# Patient Record
Sex: MALE | Race: BLACK OR AFRICAN AMERICAN | NOT HISPANIC OR LATINO | Employment: UNEMPLOYED | URBAN - METROPOLITAN AREA
[De-identification: names, ages, dates, MRNs, and addresses within clinical notes are randomized per-mention and may not be internally consistent; named-entity substitution may affect disease eponyms.]

---

## 2017-04-18 ENCOUNTER — HOSPITAL ENCOUNTER (EMERGENCY)
Facility: CLINIC | Age: 22
Discharge: HOME | End: 2017-04-18
Attending: EMERGENCY MEDICINE | Admitting: EMERGENCY MEDICINE
Payer: COMMERCIAL

## 2017-04-18 VITALS
HEIGHT: 67 IN | DIASTOLIC BLOOD PRESSURE: 63 MMHG | BODY MASS INDEX: 21.19 KG/M2 | WEIGHT: 135 LBS | HEART RATE: 70 BPM | SYSTOLIC BLOOD PRESSURE: 119 MMHG | RESPIRATION RATE: 16 BRPM | OXYGEN SATURATION: 98 % | TEMPERATURE: 97.6 F

## 2017-04-18 DIAGNOSIS — M54.50 ACUTE LEFT-SIDED LOW BACK PAIN WITHOUT SCIATICA: ICD-10-CM

## 2017-04-18 LAB
ALBUMIN UR-MCNC: NEGATIVE MG/DL
APPEARANCE UR: CLEAR
BILIRUB UR QL STRIP: NEGATIVE
COLOR UR AUTO: YELLOW
GLUCOSE UR STRIP-MCNC: NEGATIVE MG/DL
HGB UR QL STRIP: NEGATIVE
KETONES UR STRIP-MCNC: NEGATIVE MG/DL
LEUKOCYTE ESTERASE UR QL STRIP: NEGATIVE
MUCOUS THREADS #/AREA URNS LPF: PRESENT /LPF
NITRATE UR QL: NEGATIVE
PH UR STRIP: 5 PH (ref 5–7)
RBC #/AREA URNS AUTO: 1 /HPF (ref 0–2)
SP GR UR STRIP: 1.02 (ref 1–1.03)
SQUAMOUS #/AREA URNS AUTO: <1 /HPF (ref 0–1)
URN SPEC COLLECT METH UR: ABNORMAL
UROBILINOGEN UR STRIP-MCNC: NORMAL MG/DL (ref 0–2)
WBC #/AREA URNS AUTO: 4 /HPF (ref 0–2)

## 2017-04-18 PROCEDURE — 81001 URINALYSIS AUTO W/SCOPE: CPT | Performed by: EMERGENCY MEDICINE

## 2017-04-18 PROCEDURE — 25000132 ZZH RX MED GY IP 250 OP 250 PS 637: Performed by: EMERGENCY MEDICINE

## 2017-04-18 PROCEDURE — 87086 URINE CULTURE/COLONY COUNT: CPT | Performed by: EMERGENCY MEDICINE

## 2017-04-18 PROCEDURE — 99283 EMERGENCY DEPT VISIT LOW MDM: CPT

## 2017-04-18 RX ORDER — HYDROCODONE BITARTRATE AND ACETAMINOPHEN 5; 325 MG/1; MG/1
2 TABLET ORAL ONCE
Status: COMPLETED | OUTPATIENT
Start: 2017-04-18 | End: 2017-04-18

## 2017-04-18 RX ORDER — HYDROCODONE BITARTRATE AND ACETAMINOPHEN 5; 325 MG/1; MG/1
1-2 TABLET ORAL EVERY 4 HOURS PRN
Qty: 15 TABLET | Refills: 0 | Status: SHIPPED | OUTPATIENT
Start: 2017-04-18 | End: 2017-07-08

## 2017-04-18 RX ADMIN — HYDROCODONE BITARTRATE AND ACETAMINOPHEN 2 TABLET: 5; 325 TABLET ORAL at 09:22

## 2017-04-18 ASSESSMENT — ENCOUNTER SYMPTOMS
VOMITING: 0
DYSURIA: 0
HEMATURIA: 0
NAUSEA: 0
FLANK PAIN: 1
DIARRHEA: 0
FREQUENCY: 1

## 2017-04-18 NOTE — ED AVS SNAPSHOT
Emergency Department    [address] [place] [address]    Phone:  [phone]    Fax:  [phone]                                       Danny Rico   MRN: [id number]    Department:   Emergency Department   Date of Visit:  4/18/2017           After Visit Summary Signature Page     I have received my discharge instructions, and my questions have been answered. I have discussed any challenges I see with this plan with the nurse or doctor.    ..........................................................................................................................................  Patient/Patient Representative Signature      ..........................................................................................................................................  Patient Representative Print Name and Relationship to Patient    ..................................................               ................................................  Date                                            Time    ..........................................................................................................................................  Reviewed by Signature/Title    ...................................................              ..............................................  Date                                                            Time

## 2017-04-18 NOTE — ED AVS SNAPSHOT
Emergency Department    [address] [facility] [address]    Phone:  [phone]    Fax:  [phone]                                       Danny Rico   MRN: [id number]    Department:   Emergency Department   Date of Visit:  4/18/2017           Patient Information     Date Of Birth          1995        Your diagnoses for this visit were:     Acute left-sided low back pain without sciatica        You were seen by Carmine Garcia MD.      Follow-up Information     Schedule an appointment as soon as possible for a visit with Carlos Ybarra MD.    Specialty:  Family Practice    Contact information:    [address] [address]  [phone]          Follow up with  Emergency Department.    Specialty:  EMERGENCY MEDICINE    Why:  If symptoms worsen    Contact information:    [address] [facility] [id number] [phone]        Discharge Instructions         Discharge Instructions  Back Pain  You were seen today for back pain. Back pain can have many causes, but most will get better without surgery or other specific treatment. Sometimes there is a herniated ( slipped ) disc. We don t usually do MRI scans to look for these right away, since most herniated discs will get better on their own with time.  Today, we did not find any evidence that your back pain was caused by a serious condition, such as an infection, fracture, or tumor. However, sometimes symptoms develop over time and cannot be found during an emergency visit, so it is very important that you follow up with your primary doctor.  Return to the Emergency Department if:    You develop a fever with your back pain.     You have weakness or change in sensation in one or both legs.    You lose control of your bowels or bladder, or can t empty your bladder.    Your pain gets much worse.     Follow-up with your doctor:    Unless your pain has completely gone  away, please make an appointment with your doctor within one week.  You may need further management of your back pain, such as more pain medication, imaging such as an X-ray or MRI, or physical therapy.    What can I do to help myself?    Remain Active -- People are often afraid that they will hurt their back further or delay recovery by remaining active, but this is one of the best things you can do for your back. In fact, prolonged bed rest is not recommended. Studies have shown that people with low back pain recover faster when they remain active. Movement helps to bring blood flow to the muscles and relieve muscle spasms as well as preventing loss of muscle strength.    Heat -- Using a heating pad can help with low back pain during the first few weeks. Do not sleep with a heating pad, as you can be burned.     Pain medications - You may take a pain medication such as Tylenol  (acetaminophen), Advil , Nuprin  (ibuprofen) or Aleve  (naproxen).  If you have been given a narcotic such as Vicodin  (hydrocodone with acetaminophen), Percocet  (oxycodone with acetaminophen), codeine, or a muscle relaxant such as Flexeril  (cyclobenzaprine) or Soma  (carisoprodol), do not drive for four hours after you have taken it. If the narcotic contains Tylenol  (acetaminophen), do not take Tylenol  with it. All narcotics will cause constipation, so eat a high fiber diet.   If you were given a prescription for medicine here today, be sure to read all of the information (including the package insert) that comes with your prescription.  This will include important information about the medicine, its side effects, and any warnings that you need to know about.  The pharmacist who fills the prescription can provide more information and answer questions you may have about the medicine.  If you have questions or concerns that the pharmacist cannot address, please call or return to the Emergency Department.   Opioid Medication  Information    Pain medications are among the most commonly prescribed medicines, so we are including this information for all our patients. If you did not receive pain medication or get a prescription for pain medicine, you can ignore it.     You may have been given a prescription for an opioid (narcotic) pain medicine and/or have received a pain medicine while here in the Emergency Department. These medicines can make you drowsy or impaired. You must not drive, operate dangerous equipment, or engage in any other dangerous activities while taking these medications. If you drive while taking these medications, you could be arrested for DUI, or driving under the influence. Do not drink any alcohol while you are taking these medications.     Opioid pain medications can cause addiction. If you have a history of chemical dependency of any type, you are at a higher risk of becoming addicted to pain medications.  Only take these prescribed medications to treat your pain when all other options have been tried. Take it for as short a time and as few doses as possible. Store your pain pills in a secure place, as they are frequently stolen and provide a dangerous opportunity for children or visitors in your house to start abusing these powerful medications. We will not replace any lost or stolen medicine.  As soon as your pain is better, you should flush all your remaining medication.     Many prescription pain medications contain Tylenol  (acetaminophen), including Vicodin , Tylenol #3 , Norco , Lortab , and Percocet .  You should not take any extra pills of Tylenol  if you are using these prescription medications or you can get very sick.  Do not ever take more than 3000 mg of acetaminophen in any 24 hour period.    All opioids tend to cause constipation. Drink plenty of water and eat foods that have a lot of fiber, such as fruits, vegetables, prune juice, apple juice and high fiber cereal.  Take a laxative if you don t  move your bowels at least every other day. Miralax , Milk of Magnesia, Colace , or Senna  can be used to keep you regular.      Remember that you can always come back to the Emergency Department if you are not able to see your regular doctor in the amount of time listed above, if you get any new symptoms, or if there is anything that worries you.        24 Hour Appointment Hotline       To make an appointment at any [facility], call [phone] ([phone]). If you don't have a family doctor or clinic, we will help you find one. [facility] are conveniently located to serve the needs of you and your family.             Review of your medicines      START taking        Dose / Directions Last dose taken    HYDROcodone-acetaminophen 5-325 MG per tablet   Commonly known as:  NORCO   Dose:  1-2 tablet   Quantity:  15 tablet        Take 1-2 tablets by mouth every 4 hours as needed   Refills:  0                Prescriptions were sent or printed at these locations (1 Prescription)                   Other Prescriptions                Printed at Department/Unit printer (1 of 1)         HYDROcodone-acetaminophen (NORCO) 5-325 MG per tablet                Procedures and tests performed during your visit     UA with Microscopic    Urine Culture      Orders Needing Specimen Collection     None      Pending Results     Date and Time Order Name Status Description    4/18/2017 0918 Urine Culture In process             Pending Culture Results     Date and Time Order Name Status Description    4/18/2017 0918 Urine Culture In process             Test Results From Your Hospital Stay        4/18/2017  9:15 AM      Component Results     Component Value Ref Range & Units Status    Color Urine Yellow  Final    Appearance Urine Clear  Final    Glucose Urine Negative NEG mg/dL Final    Bilirubin Urine Negative NEG Final    Ketones Urine Negative NEG mg/dL Final    Specific Gravity Urine 1.023 1.003 - 1.035 Final    Blood  Urine Negative NEG Final    pH Urine 5.0 5.0 - 7.0 pH Final    Protein Albumin Urine Negative NEG mg/dL Final    Urobilinogen mg/dL Normal 0.0 - 2.0 mg/dL Final    Nitrite Urine Negative NEG Final    Leukocyte Esterase Urine Negative NEG Final    Source Midstream Urine  Final    WBC Urine 4 (H) 0 - 2 /HPF Final    RBC Urine 1 0 - 2 /HPF Final    Squamous Epithelial /HPF Urine <1 0 - 1 /HPF Final    Mucous Urine Present (A) NEG /LPF Final         4/18/2017  9:32 AM                Clinical Quality Measure: Blood Pressure Screening     Your blood pressure was checked while you were in the emergency department today. The last reading we obtained was  BP: 119/63 . Please read the guidelines below about what these numbers mean and what you should do about them.  If your systolic blood pressure (the top number) is less than 120 and your diastolic blood pressure (the bottom number) is less than 80, then your blood pressure is normal. There is nothing more that you need to do about it.  If your systolic blood pressure (the top number) is 120-139 or your diastolic blood pressure (the bottom number) is 80-89, your blood pressure may be higher than it should be. You should have your blood pressure rechecked within a year by a primary care provider.  If your systolic blood pressure (the top number) is 140 or greater or your diastolic blood pressure (the bottom number) is 90 or greater, you may have high blood pressure. High blood pressure is treatable, but if left untreated over time it can put you at risk for heart attack, stroke, or kidney failure. You should have your blood pressure rechecked by a primary care provider within the next 4 weeks.  If your provider in the emergency department today gave you specific instructions to follow-up with your doctor or provider even sooner than that, you should follow that instruction and not wait for up to 4 weeks for your follow-up visit.        Thank you for choosing [facility]      "  Thank you for choosing [facility] for your care. Our goal is always to provide you with excellent care. Hearing back from our patients is one way we can continue to improve our services. Please take a few minutes to complete the written survey that you may receive in the mail after you visit with us. Thank you!        [facility] Information     [facility] lets you send messages to your doctor, view your test results, renew your prescriptions, schedule appointments and more. To sign up, go to [email or web address] . Click on \"Log in\" on the left side of the screen, which will take you to the Welcome page. Then click on \"Sign up Now\" on the right side of the page.     You will be asked to enter the access code listed below, as well as some personal information. Please follow the directions to create your username and password.     Your access code is: [email or web address]  Expires: 2017  9:33 AM     Your access code will  in 90 days. If you need help or a new code, please call your [facility] clinic or [phone].        Care EveryWhere ID     This is your Care EveryWhere ID. This could be used by other organizations to access your [facility] medical records  [id number]        After Visit Summary       This is your record. Keep this with you and show to your community pharmacist(s) and doctor(s) at your next visit.                  "

## 2017-04-18 NOTE — ED PROVIDER NOTES
"  History     Chief Complaint:  Flank Pain    HPI   Danny Rico is a 21 year old male who presents to the emergency department today for evaluation of bilateral flank pain that began 2 days ago. Pain is constant and patient has difficulty finding a comfortable sitting position with adjustments. He also reports more frequent urination that began concurrently with the flank pain. Tylenol at home provides poor relief, prompting his visit today, and patient states that he has been having poor sleep secondary to pain as a result. Patient denies recent trauma or injury. He also denies blood in his urine, pain with urination, testicular pain, vomiting, nausea, or bowel movement symptoms. No other concerns were voiced at this time.     Allergies:  No Known Drug Allergies    Medications:    The patient is currently on no regular medications.      Past Medical History:    History reviewed. No pertinent past medical history.    Past Surgical History:    History reviewed. No pertinent past surgical history.    Family History:    History reviewed. No pertinent family history.     Social History:  The patient was accompanied to the ED by significant other.  Smoking Status: Positive  Alcohol Use: Positive    Review of Systems   Gastrointestinal: Negative for diarrhea, nausea and vomiting.   Genitourinary: Positive for flank pain and frequency. Negative for dysuria, hematuria and testicular pain.   All other systems reviewed and are negative.    Physical Exam   First Vitals:  BP: 119/63  Pulse: 70  Temp: 97.6  F (36.4  C)  Resp: 16  Height: 170.2 cm (5' 7\")  Weight: 61.2 kg (135 lb)  SpO2: 98 %    Physical Exam  Nursing note and vitals reviewed.  Constitutional:  Oriented to person, place, and time. Vital signs are normal. Cooperative.   HENT:   Mouth/Throat:   Oropharynx is clear and moist and mucous membranes are normal.   Eyes:    Conjunctivae are normal.      Pupils are equal, round, and reactive to light.   Neck: "    Trachea normal and normal range of motion.   Cardiovascular:  Normal rate, regular rhythm and normal heart sounds.    Pulses:   Radial pulses are 2+ bilaterally. Dorsalis pedis pulses are 2+ bilaterally.   Pulmonary/Chest:  Effort normal.   Abdominal:   Soft. Normal appearance and bowel sounds are normal.      Exhibits no distension. There is no tenderness.      There is no rebound and no CVA tenderness.   Rectal:   Prostate is normal without any tenderness or bogginess.  Musculoskeletal:  Extremities atraumatic x 4.      Back atraumatic, but tenderness to palpation across entire lumbar spine region.      No bony stepoffs or crepitus.   Lymphadenopathy:  No cervical adenopathy.   Neurological:   Alert and oriented to person, place, and time. Normal strength and normal reflexes. Not disoriented. No cranial nerve deficit or sensory      deficit. Coordination and gait normal. GCS eye subscore is 4. GCS verbal subscore is 5. GCS motor      subscore is 6. 5/5 strength in all muscle groups of the lower extremities.  Sensation intact to light touch distally. DTRs equal and symmetric in the lower extremities. Straight leg raises negative bilaterally.  Skin:    Skin is warm, dry and intact.      No rash noted.   Psychiatric:   Normal mood and affect. Speech is normal and behavior is normal. Judgment normal. Cognition and memory are normal.    Emergency Department Course     Laboratory:  Laboratory findings were communicated with the patient who voiced understanding of the findings.  UA: WBC: 4(H), Mucous urine: Present(A)    Interventions:  0922 Norco 5-325 mg 2 tablets Oral     Emergency Department Course:  Nursing notes and vitals reviewed.  I performed an exam of the patient as documented above.   The patient provided a urine sample here in the emergency department. This was sent for laboratory testing, findings above.    At 0921 the patient was rechecked and updated on lab results. We discussed plan of care.     I  personally reviewed the treatment plan with the Patient and answered all related questions prior to discharge.    Impression & Plan      Medical Decision Making:  Danny Rico is a 21 year old male who presents with bilateral low back pain. He also has some urinary frequency, but no dysuria or hematuria. He also does not have any abdominal pain or other concerns. My suspicion is that his pain is musculoskeletal in nature, but I also considered the possibility of a herniated disc, kidney stone, kidney infection, or prostate infection. He has no testicular pain and no penile discharge, and therefore I do not believe that this is an STI. I looked him up in the prescription drug database and he has no prescriptions listed. I agreed to provide him Norco here and send him home with a prescription for home. His UA shows a few WBC's but is otherwise negative. I indicated that I would be culturing that, and if it grows anything require treatment, somebody will call him. I recommended ice as well as Ibuprofen. He is to follow up with his primary MD as soon as possible and certainly return with any concerns or worsening symptoms.     Diagnosis:  1. Acute bilateral low back pain without sciatica    Disposition:   Discharged to home. Plan for follow up with Carlos Ybarra    Discharge Medications:  New Prescriptions    HYDROCODONE-ACETAMINOPHEN (NORCO) 5-325 MG PER TABLET    Take 1-2 tablets by mouth every 4 hours as needed       Scribe Disclosure:  I, Nick Pérez, am serving as a scribe at 8:58 AM on 4/18/2017 to document services personally performed by Carmine Garcia MD, based on my observations and the provider's statements to me.   EMERGENCY DEPARTMENT       Carmine Garcia MD  04/18/17 2000

## 2017-04-19 LAB
BACTERIA SPEC CULT: NORMAL
Lab: NORMAL
MICRO REPORT STATUS: NORMAL
SPECIMEN SOURCE: NORMAL

## 2017-06-04 ENCOUNTER — HOSPITAL ENCOUNTER (EMERGENCY)
Facility: CLINIC | Age: 22
Discharge: HOME | End: 2017-06-04
Attending: EMERGENCY MEDICINE | Admitting: EMERGENCY MEDICINE
Payer: COMMERCIAL

## 2017-06-04 VITALS
DIASTOLIC BLOOD PRESSURE: 67 MMHG | TEMPERATURE: 97.4 F | OXYGEN SATURATION: 95 % | SYSTOLIC BLOOD PRESSURE: 116 MMHG | HEIGHT: 67 IN | BODY MASS INDEX: 21.97 KG/M2 | WEIGHT: 140 LBS | RESPIRATION RATE: 18 BRPM
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
## 2017-06-04 DIAGNOSIS — Z23 NEED FOR TDAP VACCINATION: ICD-10-CM

## 2017-06-04 PROCEDURE — 25000125 ZZHC RX 250: Performed by: EMERGENCY MEDICINE

## 2017-06-04 PROCEDURE — 90471 IMMUNIZATION ADMIN: CPT

## 2017-06-04 PROCEDURE — 10120 INC&RMVL FB SUBQ TISS SMPL: CPT

## 2017-06-04 PROCEDURE — 99283 EMERGENCY DEPT VISIT LOW MDM: CPT | Mod: 25

## 2017-06-04 PROCEDURE — 90715 TDAP VACCINE 7 YRS/> IM: CPT | Performed by: EMERGENCY MEDICINE

## 2017-06-04 RX ADMIN — CLOSTRIDIUM TETANI TOXOID ANTIGEN (FORMALDEHYDE INACTIVATED), CORYNEBACTERIUM DIPHTHERIAE TOXOID ANTIGEN (FORMALDEHYDE INACTIVATED), BORDETELLA PERTUSSIS TOXOID ANTIGEN (GLUTARALDEHYDE INACTIVATED), BORDETELLA PERTUSSIS FILAMENTOUS HEMAGGLUTININ ANTIGEN (FORMALDEHYDE INACTIVATED), BORDETELLA PERTUSSIS PERTACTIN ANTIGEN, AND BORDETELLA PERTUSSIS FIMBRIAE 2/3 ANTIGEN 0.5 ML: 5; 2; 2.5; 5; 3; 5 INJECTION, SUSPENSION INTRAMUSCULAR at 23:35

## 2017-06-04 NOTE — ED AVS SNAPSHOT
Emergency Department    [address] [facility] [address]    Phone:  [phone]    Fax:  [phone]                                       Danny Rico   MRN: [id number]    Department:   Emergency Department   Date of Visit:  6/4/2017           Patient Information     Date Of Birth          1995        Your diagnoses for this visit were:     Fishing hook foreign body of left thigh, s/p removal, initial encounter     Need for Tdap vaccination        You were seen by Carmine Garcia MD.      Follow-up Information     Call to follow up.    Why:  your physician, As needed        Follow up with  Emergency Department.    Specialty:  EMERGENCY MEDICINE    Why:  If symptoms worsen    Contact information:    [address] [facility] [address] [phone]        Discharge Instructions         Stab Wound  A stab wound usually causes a small opening at the skin, but may go very deep. As a result, nerves, tendons, blood vessels, and organs can be injured. Your exam today did not show injury to any deep organs or tissues. However, a deep injury may not be found during the first exam.   Depending on the type of wound, the skin opening may not be sutured closed. This is to reduce problems in the event of an infection.  Home care  The following guidelines will help you care for your wound at home:    Keep the wound clean and dry. If a bandage was applied and it becomes wet or dirty, replace it. Otherwise, leave it in place for the first 24 hours.    If the wound was left open or if sutures were used, clean the wound daily:    After removing the bandage, wash the area with soap and water. Use a wet cotton swab to loosen and remove any blood or crust that forms.    After cleaning, apply a thin layer of antibiotic ointment. This will keep the wound clean and make it easier to remove any stitches, if they were used. Reapply the bandage.    You may remove the bandage and shower  as usual after the first 24 hours, but do not soak the area in water (no swimming) until any sutures or staples are removed.    If surgical tape was used, keep the area clean and dry. If it becomes wet, blot it dry with a towel.    If bleeding occurs from the wound, cover with a gauze or towel and apply firm direct pressure without letting go for five full minutes by the clock. This gives time for a clot to form. If this does not stop bleeding, return to the hospital promptly  Follow-up care  Most skin wounds heal within 10 days. However, even with proper treatment, a wound infection may occur. Check the wound daily for signs of infection listed below. Return to have stitches or staples removed as instructed by your health care provider. If surgical tape closures were used, you may remove them yourself after 10 days if they have not fallen off by then. Notify your doctor if you notice persistent numbness or weakness in the injured extremity.  When to seek medical advice  Call your health care provider right away if any of these occur:    Bleeding not controlled by direct pressure    Wound bleeding for longer than 24 hours    Signs of infection:    Increasing pain in the wound    Fever of 100.4 F (38 C) or higher, or as directed by your health care provider    Redness or swelling of the wound, or pus coming from the wound    Sutures or staples (if you have them) come apart or fall out before your next appointment  When to call 911  For neck, chest, back, or abdomen wounds, call 911 if you have shortness of breath, painful breathing, increasing back or abdomen pain, blood in the stool or urine, weakness, dizziness, or fainting.    [phone] [employer]. [address] [place] [address], [place] [address]. All rights reserved. This information is not intended as a substitute for professional medical care. Always follow your healthcare professional's instructions.          24 Hour Appointment Hotline       To make  an appointment at any [facility], call [phone] ([phone]). If you don't have a family doctor or clinic, we will help you find one. [facility] are conveniently located to serve the needs of you and your family.             Review of your medicines      Our records show that you are taking the medicines listed below. If these are incorrect, please call your family doctor or clinic.        Dose / Directions Last dose taken    HYDROcodone-acetaminophen 5-325 MG per tablet   Commonly known as:  NORCO   Dose:  1-2 tablet   Quantity:  15 tablet        Take 1-2 tablets by mouth every 4 hours as needed   Refills:  0                Orders Needing Specimen Collection     None      Pending Results     No orders found from 6/2/2017 to 6/5/2017.            Pending Culture Results     No orders found from 6/2/2017 to 6/5/2017.            Pending Results Instructions     If you had any lab results that were not finalized at the time of your Discharge, you can call the ED Lab Result RN at [phone]. You will be contacted by this team for any positive Lab results or changes in treatment. The nurses are available 7 days a week from 10A to 6:30P.  You can leave a message 24 hours per day and they will return your call.        Test Results From Your Hospital Stay               Clinical Quality Measure: Blood Pressure Screening     Your blood pressure was checked while you were in the emergency department today. The last reading we obtained was  BP: 116/67 . Please read the guidelines below about what these numbers mean and what you should do about them.  If your systolic blood pressure (the top number) is less than 120 and your diastolic blood pressure (the bottom number) is less than 80, then your blood pressure is normal. There is nothing more that you need to do about it.  If your systolic blood pressure (the top number) is 120-139 or your diastolic blood pressure (the bottom number) is 80-89, your blood  "pressure may be higher than it should be. You should have your blood pressure rechecked within a year by a primary care provider.  If your systolic blood pressure (the top number) is 140 or greater or your diastolic blood pressure (the bottom number) is 90 or greater, you may have high blood pressure. High blood pressure is treatable, but if left untreated over time it can put you at risk for heart attack, stroke, or kidney failure. You should have your blood pressure rechecked by a primary care provider within the next 4 weeks.  If your provider in the emergency department today gave you specific instructions to follow-up with your doctor or provider even sooner than that, you should follow that instruction and not wait for up to 4 weeks for your follow-up visit.        Thank you for choosing [facility]       Thank you for choosing [facility] for your care. Our goal is always to provide you with excellent care. Hearing back from our patients is one way we can continue to improve our services. Please take a few minutes to complete the written survey that you may receive in the mail after you visit with us. Thank you!        [employer] Information     [employer] lets you send messages to your doctor, view your test results, renew your prescriptions, schedule appointments and more. To sign up, go to [email or web address] . Click on \"Log in\" on the left side of the screen, which will take you to the Welcome page. Then click on \"Sign up Now\" on the right side of the page.     You will be asked to enter the access code listed below, as well as some personal information. Please follow the directions to create your username and password.     Your access code is: [email or web address]  Expires: 2017  9:33 AM     Your access code will  in 90 days. If you need help or a new code, please call your [facility] clinic or [phone].        Care EveryWhere ID     This is your Care EveryWhere ID. This could be used by other organizations " to access your [facility] medical records  [id number]        After Visit Summary       This is your record. Keep this with you and show to your community pharmacist(s) and doctor(s) at your next visit.

## 2017-06-05 ASSESSMENT — ENCOUNTER SYMPTOMS: WOUND: 1

## 2017-06-05 NOTE — DISCHARGE INSTRUCTIONS
Stab Wound  A stab wound usually causes a small opening at the skin, but may go very deep. As a result, nerves, tendons, blood vessels, and organs can be injured. Your exam today did not show injury to any deep organs or tissues. However, a deep injury may not be found during the first exam.   Depending on the type of wound, the skin opening may not be sutured closed. This is to reduce problems in the event of an infection.  Home care  The following guidelines will help you care for your wound at home:    Keep the wound clean and dry. If a bandage was applied and it becomes wet or dirty, replace it. Otherwise, leave it in place for the first 24 hours.    If the wound was left open or if sutures were used, clean the wound daily:    After removing the bandage, wash the area with soap and water. Use a wet cotton swab to loosen and remove any blood or crust that forms.    After cleaning, apply a thin layer of antibiotic ointment. This will keep the wound clean and make it easier to remove any stitches, if they were used. Reapply the bandage.    You may remove the bandage and shower as usual after the first 24 hours, but do not soak the area in water (no swimming) until any sutures or staples are removed.    If surgical tape was used, keep the area clean and dry. If it becomes wet, blot it dry with a towel.    If bleeding occurs from the wound, cover with a gauze or towel and apply firm direct pressure without letting go for five full minutes by the clock. This gives time for a clot to form. If this does not stop bleeding, return to the hospital promptly  Follow-up care  Most skin wounds heal within 10 days. However, even with proper treatment, a wound infection may occur. Check the wound daily for signs of infection listed below. Return to have stitches or staples removed as instructed by your health care provider. If surgical tape closures were used, you may remove them yourself after 10 days if they have not fallen  off by then. Notify your doctor if you notice persistent numbness or weakness in the injured extremity.  When to seek medical advice  Call your health care provider right away if any of these occur:    Bleeding not controlled by direct pressure    Wound bleeding for longer than 24 hours    Signs of infection:    Increasing pain in the wound    Fever of 100.4 F (38 C) or higher, or as directed by your health care provider    Redness or swelling of the wound, or pus coming from the wound    Sutures or staples (if you have them) come apart or fall out before your next appointment  When to call 911  For neck, chest, back, or abdomen wounds, call 911 if you have shortness of breath, painful breathing, increasing back or abdomen pain, blood in the stool or urine, weakness, dizziness, or fainting.    [phone] [employer]. [address] [place] [address], [place] [address]. All rights reserved. This information is not intended as a substitute for professional medical care. Always follow your healthcare professional's instructions.

## 2017-06-05 NOTE — ED NOTES
Pt was picking up a backpack that had a fish hook sticking out of it. Now fish hook stuck in pt's upper left thigh. Tetanus UTD.

## 2017-06-05 NOTE — ED PROVIDER NOTES
"  History     Chief Complaint:  Foreign Body in Skin      HPI   Danny Rico is a 21 year old otherwise healthy male who present to the emergency department today for evaluation of a forgein body in skin. The patient was fishing for the first time in a long time when he got a fish hook stuck in his left thigh. He denies any other medical concerns. The patient is currently not up to date on his tetanus.     Allergies:  NKDA     Medications:    No daily medications.     Past Medical History:    History reviewed. No pertinent medical history.     Past Surgical History:    Surgical history reviewed. No pertinent surgical history.    Family History:    History reviewed. No pertinent family history.    Social History:  Marital Status:  Single [1]  Tobacco: Current Everyday Smoker  Alcohol: Positive  Presents with significant other.     Review of Systems   Skin: Positive for wound (fish hook of left thigh).   All other systems reviewed and are negative.    Physical Exam   First Vitals:  BP: 116/67  Heart Rate: 75  Temp: 97.4  F (36.3  C)  Resp: 18  Height: 170.2 cm (5' 7\")  Weight: 63.5 kg (140 lb)  SpO2: 95 %      Physical Exam  Nursing note and vitals reviewed.  Constitutional:  Oriented to person, place, and time. Cooperative.   HENT:   Nose:    Nose normal.   Mouth/Throat:   Mucous membranes are normal.   Eyes:    Conjunctivae normal and EOM are normal.      Pupils are equal, round, and reactive to light.   Neck:    Trachea normal.   Cardiovascular:  Normal rate, regular rhythm, normal heart sounds and normal pulses. No murmur heard.  Pulmonary/Chest:  Effort normal and breath sounds normal.   Abdominal:   Soft. Normal appearance and bowel sounds are normal.      There is no tenderness.      There is no rebound and no CVA tenderness.   Musculoskeletal:  Extremities atraumatic x 4.   Lymphadenopathy:  No cervical adenopathy.   Neurological:   Alert and oriented to person, place, and time. Normal strength.      No " cranial nerve deficit or sensory deficit. GCS eye subscore is 4. GCS verbal subscore is 5. GCS motor subscore is 6.   Skin:    Fish hook embedded in the anterior left thigh.    Psychiatric:   Normal mood and affect.  Emergency Department Course   Procedures:  [facility] Procedure Note:  Physician: Dr. Carmine Garcia  PROCEDURE: Removal of foreign body from anterior left thigh  CONSENT: Verbal  INDICATION:  Imbedded fish hook in anterior left thigh  POST-PROCEDURE DIAGNOSIS: successful removal of fish hook foreign body from anterior left thigh.  PHYSICIAN:  Carmine Garcia MD  DESCRIPTION OF PROCEDURE:    Informed verbal consent. Site was correctly identified and prepped with alcohol wipe and betadine. Anesthesia was obtained with 1% xylocaine. Using a mosquito, I was able to back the fish hook out of the skin, but I did have to make a small nick in the skin with an 11 blade. The foreign body was successfully removed.  The patient tolerated this procedure well with no complications.     Interventions:  23:35 Tdap, 0.5 mL, IM    Emergency Department Course:  Nursing notes and vitals reviewed.    22:51 I performed an exam of the patient as documented above.    The patient received the intervention(s) above.    22:57 I performed a foreign body removal as noted above. Plan explained to the Patient and significant other. Patient discharged home with instructions regarding supportive care, medications, and reasons to return. The importance of close follow-up was reviewed.  Impression & Plan    Medical Decision Making:  Danny Rico is a 21 year old male came in for further evaluation of a fish hook stuck in his anterior left thigh. Unfortunately, we did not have any proper tools to cut the end of the hook. Therefore, I was not able to drive this through, but rather I had to remove it by backing it out. Therefore, I had to make a small incision in his skin to allow this to be removed. He is instructed to keep it  clean and kelp a close eye on it. He should return for any concerns for infection or other concerns as well. He was provided an update of his Tdap as well.    Diagnosis:    ICD-10-CM    1. Fishing hook foreign body of left thigh, s/p removal, initial encounter W45.8XXA    2. Need for Tdap vaccination Z23        Disposition:  discharged to home    Scribe Disclosure:  I, Shania Muse, am serving as a scribe at 10:51 PM on 6/4/2017 to document services personally performed by Carmine Garcia MD, based on my observations and the provider's statements to me.  Shania Muse  6/4/2017    EMERGENCY DEPARTMENT       Carmine Garcia MD  06/05/17 0212

## 2017-07-08 ENCOUNTER — HOSPITAL ENCOUNTER (EMERGENCY)
Facility: CLINIC | Age: 22
Discharge: HOME | End: 2017-07-08
Attending: EMERGENCY MEDICINE | Admitting: EMERGENCY MEDICINE
Payer: COMMERCIAL

## 2017-07-08 VITALS
TEMPERATURE: 98.8 F | OXYGEN SATURATION: 98 % | RESPIRATION RATE: 18 BRPM | BODY MASS INDEX: 23.49 KG/M2 | SYSTOLIC BLOOD PRESSURE: 134 MMHG | WEIGHT: 150 LBS | DIASTOLIC BLOOD PRESSURE: 69 MMHG

## 2017-07-08 DIAGNOSIS — N34.2 URETHRITIS: ICD-10-CM

## 2017-07-08 DIAGNOSIS — J06.9 VIRAL UPPER RESPIRATORY TRACT INFECTION: ICD-10-CM

## 2017-07-08 PROCEDURE — 87491 CHLMYD TRACH DNA AMP PROBE: CPT | Performed by: EMERGENCY MEDICINE

## 2017-07-08 PROCEDURE — 25000125 ZZHC RX 250: Performed by: EMERGENCY MEDICINE

## 2017-07-08 PROCEDURE — 99284 EMERGENCY DEPT VISIT MOD MDM: CPT | Mod: 25

## 2017-07-08 PROCEDURE — 25000128 H RX IP 250 OP 636: Performed by: EMERGENCY MEDICINE

## 2017-07-08 PROCEDURE — 87591 N.GONORRHOEAE DNA AMP PROB: CPT | Performed by: EMERGENCY MEDICINE

## 2017-07-08 PROCEDURE — 96372 THER/PROPH/DIAG INJ SC/IM: CPT

## 2017-07-08 PROCEDURE — 25000132 ZZH RX MED GY IP 250 OP 250 PS 637: Performed by: EMERGENCY MEDICINE

## 2017-07-08 RX ORDER — AZITHROMYCIN 250 MG/1
1000 TABLET, FILM COATED ORAL ONCE
Status: COMPLETED | OUTPATIENT
Start: 2017-07-08 | End: 2017-07-08

## 2017-07-08 RX ORDER — BENZONATATE 200 MG/1
200 CAPSULE ORAL 3 TIMES DAILY PRN
Qty: 21 CAPSULE | Refills: 0 | Status: SHIPPED | OUTPATIENT
Start: 2017-07-08 | End: 2023-09-08

## 2017-07-08 RX ADMIN — LIDOCAINE HYDROCHLORIDE 250 MG: 10 INJECTION, SOLUTION EPIDURAL; INFILTRATION; INTRACAUDAL; PERINEURAL at 09:42

## 2017-07-08 RX ADMIN — AZITHROMYCIN 1000 MG: 250 TABLET, FILM COATED ORAL at 09:32

## 2017-07-08 ASSESSMENT — ENCOUNTER SYMPTOMS
SHORTNESS OF BREATH: 0
COUGH: 1
FEVER: 0

## 2017-07-08 NOTE — ED NOTES
Pt nauseated due to antibiotics.  States he will go to vending machine to get something to eat.  Refused saltines.

## 2017-07-08 NOTE — ED PROVIDER NOTES
History     Chief Complaint:  STD, Cough    HPI   Danny Rico is a 21 year old male who presents after an STD exposure. The patient states that he had sexual contact with someone who has recently tested positive for chlamydia. The patient is asymptomatic at this time, though wants to be empirically treated. The patient also complains of URI symptoms, including cough and congested. There is no fever, shortness of breath, chest pain.     Allergies:  No Known Drug Allergies      Medications:    Norco    Past Medical History:    The patient denies any relevant past medical history.     Past Surgical History:    History reviewed. No pertinent past surgical history.     Family History:    The patient denies any relevant family medical history.     Social History:  The patient was presented to the ED alone  Smoking Status: No  Smokeless Tobacco: No  Alcohol Use: No   Marital Status:  Single [1]     Review of Systems   Constitutional: Negative for fever.   Respiratory: Positive for cough. Negative for shortness of breath.    Cardiovascular: Negative for chest pain.   Genitourinary: Negative for discharge, penile pain and penile swelling.   All other systems reviewed and are negative.    Physical Exam   Vitals:  /69  Temp 98.8  F (37.1  C) (Oral)  Resp 18  Wt 68 kg (150 lb)  SpO2 98%  BMI 23.49 kg/m2   Physical Exam  General: Alert, interactive in mild distress  Head:  Scalp is atraumatic  Eyes:  The pupils are equal, round, and reactive to light    EOM's intact    No scleral icterus  ENT:      Nose:  The external nose is normal  Ears:  External ears are normal  Mouth/Throat: The oropharynx is normal    Mucus membranes are moist       Neck:  Normal range of motion.      There is no rigidity.    Trachea is in the midline         CV:  Regular rate and rhythm    No murmur   Resp:  Breath sounds are clear bilaterally    Non-labored, no retractions or accessory muscle use     MS:  Normal strength in all 4  extremities  Skin:  Warm and dry, No rash or lesions noted.  Neuro: Strength 5/5 x4.  Sensation intact  In all 4 extremities.   Psych:  Awake. Alert.  Normal affect.      Appropriate interactions.    Emergency Department Course   Laboratory:  Laboratory findings were communicated with the patient who voiced understanding of the findings.  Chlamydia: Pending  Gonorrhea: Pending    Interventions:  0932 Zithromax 1000 mg PO  0942 Rocephin 250 mg IM     Emergency Department Course:  Nursing notes and vitals reviewed.  I performed an exam of the patient as documented above.   The patient provided a urine sample here in the emergency department. This was sent for laboratory testing, findings above.     I discussed the treatment plan with the patient. They expressed understanding of this plan and consented to discharge. They will be discharged home with instructions for care and follow up. In addition, the patient will return to the emergency department if their symptoms persist, worsen, if new symptoms arise or if there is any concern.  All questions were answered.    I personally reviewed the laboratory results with the Patient and answered all related questions prior to discharge.    Impression & Plan      Medical Decision Making:  Danny Rico is a 21 year old male who presents to the emergency department today with known chlamydia exposure. Urine and STI probe was sent. Patient was treated with ceftriaxone and azithromycin here. He's also complaining of upper respiratory symptom. There's no sign of pneumonia, no hypoxia. He was discharged with test. I've advised that all of his sexual partner be treated. He will return if any new symptoms develop.    Diagnosis:  Sexually transmitted infection   Upper respiratory tract infection, mostly likely viral     Disposition:   Discharge    Discharge Medications:  New Prescriptions    BENZONATATE (TESSALON) 200 MG CAPSULE    Take 1 capsule (200 mg) by mouth 3 times daily as  needed for cough     Scribe Disclosure:  I, Hayden Qiu, am serving as a scribe at 8:58 AM on 7/8/2017 to document services personally performed by Sergio Silvestre MD, based on my observations and the provider's statements to me.   7/8/2017    EMERGENCY DEPARTMENT       Sergio Silvestre MD  07/08/17 2595

## 2017-07-08 NOTE — ED AVS SNAPSHOT
Emergency Department    [address] [place] [address]    Phone:  [phone]    Fax:  [phone]                                       Danny Rico   MRN: [id number]    Department:   Emergency Department   Date of Visit:  7/8/2017           After Visit Summary Signature Page     I have received my discharge instructions, and my questions have been answered. I have discussed any challenges I see with this plan with the nurse or doctor.    ..........................................................................................................................................  Patient/Patient Representative Signature      ..........................................................................................................................................  Patient Representative Print Name and Relationship to Patient    ..................................................               ................................................  Date                                            Time    ..........................................................................................................................................  Reviewed by Signature/Title    ...................................................              ..............................................  Date                                                            Time

## 2017-07-08 NOTE — DISCHARGE INSTRUCTIONS
Urethritis Due to Gonorrhea or Chlamydia (Adult male)    You have urethritis. This is an inflammation in the urethra. The urethra is the tube between the bladder and the tip of the penis. Urine drains out of the body through the urethra. There are 2 main types of this condition:    Gonococcal urethritis () is an infection caused by gonorrhea.    Non-gonococcal urethritis (GENESIS) is an infection that is usually caused by chlamydia. Other infections can also be the cause.  Women often have no symptoms. Men are more likely to have symptoms, but may not. Symptoms can start within 1 week after infection, but can take a month or more, if they even occur. Some symptoms are:    Burning or pain when urinating    Irritation in the penis    Pus discharge from the penis    Pain and possible swelling in one or both testicles  Infections in the urethra are usually caused by sexually transmitted diseases, or STDs. The most common infections are gonorrhea, chlamydia, or both.  Gonorrhea infections  Gonococcal urethritis () is an infection of the urethra. It is caused by gonorrhea. Gonorrhea is a sexually transmitted infection (STI). Gonorrhea can also be in other areas of the body. This can cause:    Rectal pain and discharge    Throat infection    Eye infections (conjunctivitis)  Without treatment, the infection can get worse and spread to other parts of your body. The infection can cause rashes, arthritis, and infections in your joints, heart, and brain.  Non-gonococcal infections, or GENESIS infections  Non-gonococcal urethritis (GENESIS) is an infection of the urethra. It is usually caused by chlamydia. Symptoms may clear up in a few weeks or months, even without treatment. However, without treatment, the bacteria that cause GENESIS can stay in the urethra. This means that even if symptoms clear, you can still have an infection. You can spread it to others if you are not treated.  Urethritis caused by an infection can be cured, but it  needs to be treated with antibiotics. If you don't get treated, you can give it to someone else. If you give it to a woman, it can cause a serious pelvic infection and infertility.  It is important to remember that you can have an infection without symptoms. For this reason, your sexual partner(s) needs to be treated, even if he or she has no symptoms. If he or she is not treated, and you continue to have sex, you will be infected again. Your partner(s) should contact his or her own healthcare provider to be examined and treated. An urgent care clinic or the Public Health Department can also do this.  Home care  The following guidelines will help you care for yourself at home:    Take all the antibiotics you were given until they are used up. It is important to finish them, even if you are feeling better. This ensures the infection is completely cleared up.    No sex until both you and your partner(s) have finished all the antibiotics, and your healthcare provider says you are no longer contagious.    You can take acetaminophen or ibuprofen for pain, unless you were given a different pain medicine. If you have chronic liver or kidney disease or have ever had a stomach ulcer or GI bleeding, or are taking blood thinners, talk with your healthcare provider before using these medicines.    Aspirin should never be used in anyone under 18 years of age who has a fever.    Learn about and use safe sex practices. The safest sex is with a partner who has tested negative and only has sex with you. Condoms can keep some STDs from spreading, including gonorrhea, chlamydia and HIV, but are not a guarantee.  Follow-up care  Follow up with your healthcare provider, or as advised. If a culture test was taken, you may call for the results as directed. Another culture test should be done 4 to 6 weeks after treatment to be sure the infection is gone. Follow up with your healthcare provider or the Public Health Department for a complete  STD screening, including HIV testing. For more information about STDs, contact CDC-INFO at 383-156-6845.  When to seek medical advice  Call your healthcare provider right away if any of these occur:    No improvement after 3 days of treatment, although you can have some symptoms longer    Unable to urinate    Rash or joint pain    Painful sores on the penis    Enlarged painful lymph nodes (lumps) in the groin    Testicle pain or swelling of the scrotum  Date Last Reviewed: 10/1/2016    [id number] [employer]. [address] [place] [address]. All rights reserved. This information is not intended as a substitute for professional medical care. Always follow your healthcare professional's instructions.      Discharge Instructions  Upper Respiratory Infection    The upper respiratory tract includes the sinuses, nasal passages, pharynx, and larynx. A URI, or upper respiratory infection, is an infection of any of the parts of the upper airway. Symptoms include runny nose, congestion, sore throat, cough, and fever. URIs are almost always caused by a virus. Antibiotics do not help with virus infections, so are not used for an ordinary URI. A URI is very contagious through coughing and nasal secretions; make sure you wash your hands often and clean surfaces after sneezing, coughing or touching them.  Viruses can live on surfaces for up to 3 days.      Return to the Emergency Department if:    Any of the symptoms you have get much worse.    You seem very sick, like being too weak to get up.    You have any new symptoms, especially serious things like chest pain.     You are short of breath.     You have a severe headache.    You are vomiting so much you can t keep fluids or medicines down.    You have confusion or seem unusually drowsy.    You have a seizure or convulsion.    Follow-up:      You should start to improve in 3 - 5 days.  A cough can linger for up to six weeks, but overall you should be  feeling much better.  See your doctor if you have a fever for more than 3 days, or if you are not feeling better within 5 days.      What can I do to help myself?    Fill any prescriptions the doctor gave you and take them right away    If you have a fever, get plenty of rest and drink lots of fluids, especially water. Using a humidifier or saline nose spray will also help loosen secretions.     What clothes or blankets you have on won t change your fever. Do what is comfortable for you.    Bathing or sponging in lukewarm water may help you feel better.    Tylenol  (acetaminophen), Motrin  (ibuprofen), or Advil  (ibuprofen) help bring fever down and may help you feel more comfortable. Be sure to read and follow the package directions, and ask your doctor if you have questions.    Do not drink alcohol.    Decongestants may help you feel better. You may use decongestant nose sprays Afrin  (oxymetazoline) or [place]-Synephrine  (phenylephrine hydrochloride) for up to 3 days, or may use a decongestant tablet like Sudafed  (pseudoephedrine).  If you were given a prescription for medicine here today, be sure to read all of the information (including the package insert) that comes with your prescription.  This will include important information about the medicine, its side effects, and any warnings that you need to know about.  The pharmacist who fills the prescription can provide more information and answer questions you may have about the medicine.  If you have questions or concerns that the pharmacist cannot address, please call or return to the Emergency Department.   Opioid Medication Information    Pain medications are among the most commonly prescribed medicines, so we are including this information for all our patients. If you did not receive pain medication or get a prescription for pain medicine, you can ignore it.     You may have been given a prescription for an opioid (narcotic) pain medicine and/or have received  a pain medicine while here in the Emergency Department. These medicines can make you drowsy or impaired. You must not drive, operate dangerous equipment, or engage in any other dangerous activities while taking these medications. If you drive while taking these medications, you could be arrested for DUI, or driving under the influence. Do not drink any alcohol while you are taking these medications.     Opioid pain medications can cause addiction. If you have a history of chemical dependency of any type, you are at a higher risk of becoming addicted to pain medications.  Only take these prescribed medications to treat your pain when all other options have been tried. Take it for as short a time and as few doses as possible. Store your pain pills in a secure place, as they are frequently stolen and provide a dangerous opportunity for children or visitors in your house to start abusing these powerful medications. We will not replace any lost or stolen medicine.  As soon as your pain is better, you should flush all your remaining medication.     Many prescription pain medications contain Tylenol  (acetaminophen), including Vicodin , Tylenol #3 , Norco , Lortab , and Percocet .  You should not take any extra pills of Tylenol  if you are using these prescription medications or you can get very sick.  Do not ever take more than 3000 mg of acetaminophen in any 24 hour period.    All opioids tend to cause constipation. Drink plenty of water and eat foods that have a lot of fiber, such as fruits, vegetables, prune juice, apple juice and high fiber cereal.  Take a laxative if you don t move your bowels at least every other day. Miralax , Milk of Magnesia, Colace , or Senna  can be used to keep you regular.      Remember that you can always come back to the Emergency Department if you are not able to see your regular doctor in the amount of time listed above, if you get any new symptoms, or if there is anything that worries  you.

## 2017-07-08 NOTE — ED AVS SNAPSHOT
Emergency Department    [address] [facility] [address]    Phone:  [phone]    Fax:  [phone]                                       Danny Rico   MRN: [id number]    Department:   Emergency Department   Date of Visit:  7/8/2017           Patient Information     Date Of Birth          1995        Your diagnoses for this visit were:     Urethritis     Viral upper respiratory tract infection        You were seen by Sergio Silvestre MD.      Follow-up Information     Follow up with None In 1 week.    Why:  Primary Provider        Follow up with  Emergency Department.    Specialty:  EMERGENCY MEDICINE    Why:  As needed, If symptoms worsen    Contact information:    [address] [facility] [address] [phone]        Discharge Instructions         Urethritis Due to Gonorrhea or Chlamydia (Adult male)    You have urethritis. This is an inflammation in the urethra. The urethra is the tube between the bladder and the tip of the penis. Urine drains out of the body through the urethra. There are 2 main types of this condition:    Gonococcal urethritis () is an infection caused by gonorrhea.    Non-gonococcal urethritis (GENESIS) is an infection that is usually caused by chlamydia. Other infections can also be the cause.  Women often have no symptoms. Men are more likely to have symptoms, but may not. Symptoms can start within 1 week after infection, but can take a month or more, if they even occur. Some symptoms are:    Burning or pain when urinating    Irritation in the penis    Pus discharge from the penis    Pain and possible swelling in one or both testicles  Infections in the urethra are usually caused by sexually transmitted diseases, or STDs. The most common infections are gonorrhea, chlamydia, or both.  Gonorrhea infections  Gonococcal urethritis () is an infection of the urethra. It is caused by gonorrhea. Gonorrhea is a sexually transmitted  infection (STI). Gonorrhea can also be in other areas of the body. This can cause:    Rectal pain and discharge    Throat infection    Eye infections (conjunctivitis)  Without treatment, the infection can get worse and spread to other parts of your body. The infection can cause rashes, arthritis, and infections in your joints, heart, and brain.  Non-gonococcal infections, or GENESIS infections  Non-gonococcal urethritis (GENESIS) is an infection of the urethra. It is usually caused by chlamydia. Symptoms may clear up in a few weeks or months, even without treatment. However, without treatment, the bacteria that cause GENESIS can stay in the urethra. This means that even if symptoms clear, you can still have an infection. You can spread it to others if you are not treated.  Urethritis caused by an infection can be cured, but it needs to be treated with antibiotics. If you don't get treated, you can give it to someone else. If you give it to a woman, it can cause a serious pelvic infection and infertility.  It is important to remember that you can have an infection without symptoms. For this reason, your sexual partner(s) needs to be treated, even if he or she has no symptoms. If he or she is not treated, and you continue to have sex, you will be infected again. Your partner(s) should contact his or her own healthcare provider to be examined and treated. An urgent care clinic or the Public Health Department can also do this.  Home care  The following guidelines will help you care for yourself at home:    Take all the antibiotics you were given until they are used up. It is important to finish them, even if you are feeling better. This ensures the infection is completely cleared up.    No sex until both you and your partner(s) have finished all the antibiotics, and your healthcare provider says you are no longer contagious.    You can take acetaminophen or ibuprofen for pain, unless you were given a different pain medicine. If you  have chronic liver or kidney disease or have ever had a stomach ulcer or GI bleeding, or are taking blood thinners, talk with your healthcare provider before using these medicines.    Aspirin should never be used in anyone under 18 years of age who has a fever.    Learn about and use safe sex practices. The safest sex is with a partner who has tested negative and only has sex with you. Condoms can keep some STDs from spreading, including gonorrhea, chlamydia and HIV, but are not a guarantee.  Follow-up care  Follow up with your healthcare provider, or as advised. If a culture test was taken, you may call for the results as directed. Another culture test should be done 4 to 6 weeks after treatment to be sure the infection is gone. Follow up with your healthcare provider or the Public Health Department for a complete STD screening, including HIV testing. For more information about STDs, contact CDC-INFO at 503-069-4325.  When to seek medical advice  Call your healthcare provider right away if any of these occur:    No improvement after 3 days of treatment, although you can have some symptoms longer    Unable to urinate    Rash or joint pain    Painful sores on the penis    Enlarged painful lymph nodes (lumps) in the groin    Testicle pain or swelling of the scrotum  Date Last Reviewed: 10/1/2016    1332-1617 [employer]. [address] [place] [address], [place] [address]. All rights reserved. This information is not intended as a substitute for professional medical care. Always follow your healthcare professional's instructions.      Discharge Instructions  Upper Respiratory Infection    The upper respiratory tract includes the sinuses, nasal passages, pharynx, and larynx. A URI, or upper respiratory infection, is an infection of any of the parts of the upper airway. Symptoms include runny nose, congestion, sore throat, cough, and fever. URIs are almost always caused by a virus. Antibiotics do not help with virus  infections, so are not used for an ordinary URI. A URI is very contagious through coughing and nasal secretions; make sure you wash your hands often and clean surfaces after sneezing, coughing or touching them.  Viruses can live on surfaces for up to 3 days.      Return to the Emergency Department if:    Any of the symptoms you have get much worse.    You seem very sick, like being too weak to get up.    You have any new symptoms, especially serious things like chest pain.     You are short of breath.     You have a severe headache.    You are vomiting so much you can t keep fluids or medicines down.    You have confusion or seem unusually drowsy.    You have a seizure or convulsion.    Follow-up:      You should start to improve in 3 - 5 days.  A cough can linger for up to six weeks, but overall you should be feeling much better.  See your doctor if you have a fever for more than 3 days, or if you are not feeling better within 5 days.      What can I do to help myself?    Fill any prescriptions the doctor gave you and take them right away    If you have a fever, get plenty of rest and drink lots of fluids, especially water. Using a humidifier or saline nose spray will also help loosen secretions.     What clothes or blankets you have on won t change your fever. Do what is comfortable for you.    Bathing or sponging in lukewarm water may help you feel better.    Tylenol  (acetaminophen), Motrin  (ibuprofen), or Advil  (ibuprofen) help bring fever down and may help you feel more comfortable. Be sure to read and follow the package directions, and ask your doctor if you have questions.    Do not drink alcohol.    Decongestants may help you feel better. You may use decongestant nose sprays Afrin  (oxymetazoline) or Phoenix-Synephrine  (phenylephrine hydrochloride) for up to 3 days, or may use a decongestant tablet like Sudafed  (pseudoephedrine).  If you were given a prescription for medicine here today, be sure to read all  of the information (including the package insert) that comes with your prescription.  This will include important information about the medicine, its side effects, and any warnings that you need to know about.  The pharmacist who fills the prescription can provide more information and answer questions you may have about the medicine.  If you have questions or concerns that the pharmacist cannot address, please call or return to the Emergency Department.   Opioid Medication Information    Pain medications are among the most commonly prescribed medicines, so we are including this information for all our patients. If you did not receive pain medication or get a prescription for pain medicine, you can ignore it.     You may have been given a prescription for an opioid (narcotic) pain medicine and/or have received a pain medicine while here in the Emergency Department. These medicines can make you drowsy or impaired. You must not drive, operate dangerous equipment, or engage in any other dangerous activities while taking these medications. If you drive while taking these medications, you could be arrested for DUI, or driving under the influence. Do not drink any alcohol while you are taking these medications.     Opioid pain medications can cause addiction. If you have a history of chemical dependency of any type, you are at a higher risk of becoming addicted to pain medications.  Only take these prescribed medications to treat your pain when all other options have been tried. Take it for as short a time and as few doses as possible. Store your pain pills in a secure place, as they are frequently stolen and provide a dangerous opportunity for children or visitors in your house to start abusing these powerful medications. We will not replace any lost or stolen medicine.  As soon as your pain is better, you should flush all your remaining medication.     Many prescription pain medications contain Tylenol  (acetaminophen),  including Vicodin , Tylenol #3 , Norco , Lortab , and Percocet .  You should not take any extra pills of Tylenol  if you are using these prescription medications or you can get very sick.  Do not ever take more than 3000 mg of acetaminophen in any 24 hour period.    All opioids tend to cause constipation. Drink plenty of water and eat foods that have a lot of fiber, such as fruits, vegetables, prune juice, apple juice and high fiber cereal.  Take a laxative if you don t move your bowels at least every other day. Miralax , Milk of Magnesia, Colace , or Senna  can be used to keep you regular.      Remember that you can always come back to the Emergency Department if you are not able to see your regular doctor in the amount of time listed above, if you get any new symptoms, or if there is anything that worries you.        24 Hour Appointment Hotline       To make an appointment at any [facility], call [phone] ([phone]). If you don't have a family doctor or clinic, we will help you find one. [facility] clinics are conveniently located to serve the needs of you and your family.             Review of your medicines      START taking        Dose / Directions Last dose taken    benzonatate 200 MG capsule   Commonly known as:  TESSALON   Dose:  200 mg   Quantity:  21 capsule        Take 1 capsule (200 mg) by mouth 3 times daily as needed for cough   Refills:  0          STOP taking        Dose Reason for stopping Comments    HYDROcodone-acetaminophen 5-325 MG per tablet   Commonly known as:  NORCO                      Prescriptions were sent or printed at these locations (1 Prescription)                   Other Prescriptions                Printed at Department/Unit printer (1 of 1)         benzonatate (TESSALON) 200 MG capsule                Procedures and tests performed during your visit     Chlamydia trachomatis PCR    Neisseria gonorrhoea PCR      Orders Needing Specimen Collection     None      Pending  Results     Date and Time Order Name Status Description    7/8/2017 0900 Neisseria gonorrhoea PCR In process     7/8/2017 0900 Chlamydia trachomatis PCR In process             Pending Culture Results     Date and Time Order Name Status Description    7/8/2017 0900 Neisseria gonorrhoea PCR In process     7/8/2017 0900 Chlamydia trachomatis PCR In process             Pending Results Instructions     If you had any lab results that were not finalized at the time of your Discharge, you can call the ED Lab Result RN at [phone]. You will be contacted by this team for any positive Lab results or changes in treatment. The nurses are available 7 days a week from 10A to 6:30P.  You can leave a message 24 hours per day and they will return your call.        Test Results From Your Hospital Stay        7/8/2017  9:37 AM         7/8/2017  9:37 AM                Clinical Quality Measure: Blood Pressure Screening     Your blood pressure was checked while you were in the emergency department today. The last reading we obtained was  BP: 134/69 . Please read the guidelines below about what these numbers mean and what you should do about them.  If your systolic blood pressure (the top number) is less than 120 and your diastolic blood pressure (the bottom number) is less than 80, then your blood pressure is normal. There is nothing more that you need to do about it.  If your systolic blood pressure (the top number) is 120-139 or your diastolic blood pressure (the bottom number) is 80-89, your blood pressure may be higher than it should be. You should have your blood pressure rechecked within a year by a primary care provider.  If your systolic blood pressure (the top number) is 140 or greater or your diastolic blood pressure (the bottom number) is 90 or greater, you may have high blood pressure. High blood pressure is treatable, but if left untreated over time it can put you at risk for heart attack, stroke, or kidney failure. You  "should have your blood pressure rechecked by a primary care provider within the next 4 weeks.  If your provider in the emergency department today gave you specific instructions to follow-up with your doctor or provider even sooner than that, you should follow that instruction and not wait for up to 4 weeks for your follow-up visit.        Thank you for choosing [facility]       Thank you for choosing [facility] for your care. Our goal is always to provide you with excellent care. Hearing back from our patients is one way we can continue to improve our services. Please take a few minutes to complete the written survey that you may receive in the mail after you visit with us. Thank you!        [employer] Information     [employer] lets you send messages to your doctor, view your test results, renew your prescriptions, schedule appointments and more. To sign up, go to [email or web address] . Click on \"Log in\" on the left side of the screen, which will take you to the Welcome page. Then click on \"Sign up Now\" on the right side of the page.     You will be asked to enter the access code listed below, as well as some personal information. Please follow the directions to create your username and password.     Your access code is: [email or web address]  Expires: 2017  9:33 AM     Your access code will  in 90 days. If you need help or a new code, please call your [facility] clinic or [phone].        Care EveryWhere ID     This is your Care EveryWhere ID. This could be used by other organizations to access your [facility] medical records  [id number]        Equal Access to Services     SAVANNAH BARKSDALE : Hadii bronwyn carranza hadasho Sosofíaali, waaxda luqadaha, qaybta kaalmada adeegyada, cyndi crabtree . So [facility] [phone].    ATENCIÓN: Si habla español, tiene a baldwin disposición servicios gratuitos de asistencia lingüística. Llame al [phone].    We comply with applicable federal civil rights laws and [place] " laws. We do not discriminate on the basis of race, color, national origin, age, disability sex, sexual orientation or gender identity.            After Visit Summary       This is your record. Keep this with you and show to your community pharmacist(s) and doctor(s) at your next visit.

## 2017-07-09 LAB
C TRACH DNA SPEC QL NAA+PROBE: NORMAL
N GONORRHOEA DNA SPEC QL NAA+PROBE: NORMAL
SPECIMEN SOURCE: NORMAL
SPECIMEN SOURCE: NORMAL

## 2023-09-08 ENCOUNTER — HOSPITAL ENCOUNTER (OUTPATIENT)
Facility: CLINIC | Age: 28
Setting detail: OBSERVATION
Discharge: PSYCHIATRIC OR SUBSTANCE USE FACILITY | End: 2023-09-10
Attending: EMERGENCY MEDICINE | Admitting: INTERNAL MEDICINE
Payer: COMMERCIAL

## 2023-09-08 DIAGNOSIS — E83.42 HYPOMAGNESEMIA: ICD-10-CM

## 2023-09-08 DIAGNOSIS — F10.230 ALCOHOL DEPENDENCE WITH UNCOMPLICATED WITHDRAWAL (H): ICD-10-CM

## 2023-09-08 DIAGNOSIS — F41.9 ANXIETY: Primary | ICD-10-CM

## 2023-09-08 DIAGNOSIS — E87.6 HYPOKALEMIA: ICD-10-CM

## 2023-09-08 DIAGNOSIS — K85.20 ALCOHOL-INDUCED ACUTE PANCREATITIS, UNSPECIFIED COMPLICATION STATUS: ICD-10-CM

## 2023-09-08 LAB
ALBUMIN SERPL BCG-MCNC: 5 G/DL (ref 3.5–5.2)
ALCOHOL BREATH TEST: 0 (ref 0–0.01)
ALP SERPL-CCNC: 116 U/L (ref 40–129)
ALT SERPL W P-5'-P-CCNC: 141 U/L (ref 0–70)
ANION GAP SERPL CALCULATED.3IONS-SCNC: 22 MMOL/L (ref 7–15)
AST SERPL W P-5'-P-CCNC: 555 U/L (ref 0–45)
BASOPHILS # BLD MANUAL: 0 10E3/UL (ref 0–0.2)
BASOPHILS NFR BLD MANUAL: 0 %
BILIRUB SERPL-MCNC: 1.8 MG/DL
BUN SERPL-MCNC: 7.6 MG/DL (ref 6–20)
CALCIUM SERPL-MCNC: 10.3 MG/DL (ref 8.6–10)
CHLORIDE SERPL-SCNC: 82 MMOL/L (ref 98–107)
CREAT SERPL-MCNC: 0.6 MG/DL (ref 0.67–1.17)
DEPRECATED HCO3 PLAS-SCNC: 26 MMOL/L (ref 22–29)
EGFRCR SERPLBLD CKD-EPI 2021: >90 ML/MIN/1.73M2
EOSINOPHIL # BLD MANUAL: 0 10E3/UL (ref 0–0.7)
EOSINOPHIL NFR BLD MANUAL: 0 %
ERYTHROCYTE [DISTWIDTH] IN BLOOD BY AUTOMATED COUNT: 11.5 % (ref 10–15)
GLUCOSE SERPL-MCNC: 98 MG/DL (ref 70–99)
HCT VFR BLD AUTO: 36.6 % (ref 40–53)
HGB BLD-MCNC: 13.3 G/DL (ref 13.3–17.7)
LIPASE SERPL-CCNC: 925 U/L (ref 13–60)
LYMPHOCYTES # BLD MANUAL: 0.3 10E3/UL (ref 0.8–5.3)
LYMPHOCYTES NFR BLD MANUAL: 6 %
MAGNESIUM SERPL-MCNC: 1.4 MG/DL (ref 1.7–2.3)
MAGNESIUM SERPL-MCNC: 1.4 MG/DL (ref 1.7–2.3)
MCH RBC QN AUTO: 32.9 PG (ref 26.5–33)
MCHC RBC AUTO-ENTMCNC: 36.3 G/DL (ref 31.5–36.5)
MCV RBC AUTO: 91 FL (ref 78–100)
MONOCYTES # BLD MANUAL: 0.3 10E3/UL (ref 0–1.3)
MONOCYTES NFR BLD MANUAL: 5 %
NEUTROPHILS # BLD MANUAL: 4.5 10E3/UL (ref 1.6–8.3)
NEUTROPHILS NFR BLD MANUAL: 89 %
OSMOLALITY SERPL: 272 MMOL/KG (ref 275–295)
PHOSPHATE SERPL-MCNC: 2.9 MG/DL (ref 2.5–4.5)
PLAT MORPH BLD: ABNORMAL
PLATELET # BLD AUTO: 120 10E3/UL (ref 150–450)
POTASSIUM SERPL-SCNC: 2.8 MMOL/L (ref 3.4–5.3)
PROT SERPL-MCNC: 8.7 G/DL (ref 6.4–8.3)
RBC # BLD AUTO: 4.04 10E6/UL (ref 4.4–5.9)
RBC MORPH BLD: ABNORMAL
SODIUM SERPL-SCNC: 130 MMOL/L (ref 136–145)
WBC # BLD AUTO: 5 10E3/UL (ref 4–11)

## 2023-09-08 PROCEDURE — 250N000011 HC RX IP 250 OP 636: Mod: JZ | Performed by: INTERNAL MEDICINE

## 2023-09-08 PROCEDURE — 250N000013 HC RX MED GY IP 250 OP 250 PS 637: Performed by: EMERGENCY MEDICINE

## 2023-09-08 PROCEDURE — 36415 COLL VENOUS BLD VENIPUNCTURE: CPT | Performed by: INTERNAL MEDICINE

## 2023-09-08 PROCEDURE — 99285 EMERGENCY DEPT VISIT HI MDM: CPT | Performed by: EMERGENCY MEDICINE

## 2023-09-08 PROCEDURE — 250N000011 HC RX IP 250 OP 636: Mod: JZ | Performed by: EMERGENCY MEDICINE

## 2023-09-08 PROCEDURE — 258N000003 HC RX IP 258 OP 636: Performed by: INTERNAL MEDICINE

## 2023-09-08 PROCEDURE — 250N000013 HC RX MED GY IP 250 OP 250 PS 637: Performed by: INTERNAL MEDICINE

## 2023-09-08 PROCEDURE — C9113 INJ PANTOPRAZOLE SODIUM, VIA: HCPCS | Mod: JZ | Performed by: EMERGENCY MEDICINE

## 2023-09-08 PROCEDURE — 83930 ASSAY OF BLOOD OSMOLALITY: CPT | Performed by: INTERNAL MEDICINE

## 2023-09-08 PROCEDURE — 83735 ASSAY OF MAGNESIUM: CPT | Performed by: INTERNAL MEDICINE

## 2023-09-08 PROCEDURE — 99222 1ST HOSP IP/OBS MODERATE 55: CPT | Performed by: INTERNAL MEDICINE

## 2023-09-08 PROCEDURE — 120N000002 HC R&B MED SURG/OB UMMC

## 2023-09-08 PROCEDURE — 36415 COLL VENOUS BLD VENIPUNCTURE: CPT | Performed by: EMERGENCY MEDICINE

## 2023-09-08 PROCEDURE — 85007 BL SMEAR W/DIFF WBC COUNT: CPT | Performed by: EMERGENCY MEDICINE

## 2023-09-08 PROCEDURE — 99285 EMERGENCY DEPT VISIT HI MDM: CPT | Mod: 25 | Performed by: EMERGENCY MEDICINE

## 2023-09-08 PROCEDURE — 83690 ASSAY OF LIPASE: CPT | Performed by: EMERGENCY MEDICINE

## 2023-09-08 PROCEDURE — 96361 HYDRATE IV INFUSION ADD-ON: CPT | Performed by: EMERGENCY MEDICINE

## 2023-09-08 PROCEDURE — 258N000003 HC RX IP 258 OP 636: Performed by: EMERGENCY MEDICINE

## 2023-09-08 PROCEDURE — 82075 ASSAY OF BREATH ETHANOL: CPT | Performed by: EMERGENCY MEDICINE

## 2023-09-08 PROCEDURE — 80053 COMPREHEN METABOLIC PANEL: CPT | Performed by: EMERGENCY MEDICINE

## 2023-09-08 PROCEDURE — 250N000009 HC RX 250: Performed by: INTERNAL MEDICINE

## 2023-09-08 PROCEDURE — C9113 INJ PANTOPRAZOLE SODIUM, VIA: HCPCS | Mod: JZ | Performed by: INTERNAL MEDICINE

## 2023-09-08 PROCEDURE — 96374 THER/PROPH/DIAG INJ IV PUSH: CPT | Performed by: EMERGENCY MEDICINE

## 2023-09-08 PROCEDURE — 84100 ASSAY OF PHOSPHORUS: CPT | Performed by: INTERNAL MEDICINE

## 2023-09-08 PROCEDURE — 83735 ASSAY OF MAGNESIUM: CPT | Performed by: EMERGENCY MEDICINE

## 2023-09-08 PROCEDURE — 85027 COMPLETE CBC AUTOMATED: CPT | Performed by: EMERGENCY MEDICINE

## 2023-09-08 PROCEDURE — 93005 ELECTROCARDIOGRAM TRACING: CPT

## 2023-09-08 PROCEDURE — 96375 TX/PRO/DX INJ NEW DRUG ADDON: CPT | Performed by: EMERGENCY MEDICINE

## 2023-09-08 RX ORDER — HYDROMORPHONE HYDROCHLORIDE 1 MG/ML
0.5 INJECTION, SOLUTION INTRAMUSCULAR; INTRAVENOUS; SUBCUTANEOUS
Status: DISCONTINUED | OUTPATIENT
Start: 2023-09-08 | End: 2023-09-10

## 2023-09-08 RX ORDER — ONDANSETRON 2 MG/ML
4 INJECTION INTRAMUSCULAR; INTRAVENOUS EVERY 30 MIN PRN
Status: DISCONTINUED | OUTPATIENT
Start: 2023-09-08 | End: 2023-09-08

## 2023-09-08 RX ORDER — HALOPERIDOL 5 MG/ML
2.5-5 INJECTION INTRAMUSCULAR EVERY 6 HOURS PRN
Status: DISCONTINUED | OUTPATIENT
Start: 2023-09-08 | End: 2023-09-09

## 2023-09-08 RX ORDER — HYDROMORPHONE HYDROCHLORIDE 1 MG/ML
0.5 INJECTION, SOLUTION INTRAMUSCULAR; INTRAVENOUS; SUBCUTANEOUS EVERY 30 MIN PRN
Status: DISCONTINUED | OUTPATIENT
Start: 2023-09-08 | End: 2023-09-10

## 2023-09-08 RX ORDER — LORAZEPAM 2 MG/ML
1-2 INJECTION INTRAMUSCULAR EVERY 30 MIN PRN
Status: DISCONTINUED | OUTPATIENT
Start: 2023-09-08 | End: 2023-09-10

## 2023-09-08 RX ORDER — GABAPENTIN 300 MG/1
300 CAPSULE ORAL EVERY 8 HOURS
Status: DISCONTINUED | OUTPATIENT
Start: 2023-09-14 | End: 2023-09-10 | Stop reason: HOSPADM

## 2023-09-08 RX ORDER — GABAPENTIN 600 MG/1
1200 TABLET ORAL ONCE
Status: COMPLETED | OUTPATIENT
Start: 2023-09-08 | End: 2023-09-08

## 2023-09-08 RX ORDER — FOLIC ACID 1 MG/1
1 TABLET ORAL DAILY
Status: DISCONTINUED | OUTPATIENT
Start: 2023-09-08 | End: 2023-09-10 | Stop reason: HOSPADM

## 2023-09-08 RX ORDER — MULTIPLE VITAMINS W/ MINERALS TAB 9MG-400MCG
1 TAB ORAL DAILY
Status: DISCONTINUED | OUTPATIENT
Start: 2023-09-08 | End: 2023-09-10 | Stop reason: HOSPADM

## 2023-09-08 RX ORDER — POTASSIUM CHLORIDE 7.45 MG/ML
10 INJECTION INTRAVENOUS ONCE
Status: COMPLETED | OUTPATIENT
Start: 2023-09-08 | End: 2023-09-08

## 2023-09-08 RX ORDER — SODIUM CHLORIDE 9 MG/ML
INJECTION, SOLUTION INTRAVENOUS
Status: DISCONTINUED
Start: 2023-09-08 | End: 2023-09-08 | Stop reason: HOSPADM

## 2023-09-08 RX ORDER — MAGNESIUM SULFATE HEPTAHYDRATE 40 MG/ML
2 INJECTION, SOLUTION INTRAVENOUS ONCE
Status: COMPLETED | OUTPATIENT
Start: 2023-09-08 | End: 2023-09-09

## 2023-09-08 RX ORDER — FLUMAZENIL 0.1 MG/ML
0.2 INJECTION, SOLUTION INTRAVENOUS
Status: DISCONTINUED | OUTPATIENT
Start: 2023-09-08 | End: 2023-09-10 | Stop reason: HOSPADM

## 2023-09-08 RX ORDER — HALOPERIDOL 5 MG/ML
2.5-5 INJECTION INTRAMUSCULAR EVERY 6 HOURS PRN
Status: DISCONTINUED | OUTPATIENT
Start: 2023-09-08 | End: 2023-09-10

## 2023-09-08 RX ORDER — GABAPENTIN 300 MG/1
900 CAPSULE ORAL EVERY 8 HOURS
Status: DISCONTINUED | OUTPATIENT
Start: 2023-09-09 | End: 2023-09-10 | Stop reason: HOSPADM

## 2023-09-08 RX ORDER — FOLIC ACID 1 MG/1
1 TABLET ORAL DAILY
Status: DISCONTINUED | OUTPATIENT
Start: 2023-09-09 | End: 2023-09-08

## 2023-09-08 RX ORDER — LORAZEPAM 1 MG/1
1-2 TABLET ORAL EVERY 30 MIN PRN
Status: DISCONTINUED | OUTPATIENT
Start: 2023-09-08 | End: 2023-09-10

## 2023-09-08 RX ORDER — ONDANSETRON 2 MG/ML
4 INJECTION INTRAMUSCULAR; INTRAVENOUS EVERY 6 HOURS PRN
Status: DISCONTINUED | OUTPATIENT
Start: 2023-09-08 | End: 2023-09-10 | Stop reason: HOSPADM

## 2023-09-08 RX ORDER — SODIUM CHLORIDE AND POTASSIUM CHLORIDE 150; 900 MG/100ML; MG/100ML
INJECTION, SOLUTION INTRAVENOUS CONTINUOUS
Status: DISCONTINUED | OUTPATIENT
Start: 2023-09-08 | End: 2023-09-08

## 2023-09-08 RX ORDER — OLANZAPINE 5 MG/1
5-10 TABLET, ORALLY DISINTEGRATING ORAL EVERY 6 HOURS PRN
Status: DISCONTINUED | OUTPATIENT
Start: 2023-09-08 | End: 2023-09-09

## 2023-09-08 RX ORDER — SODIUM CHLORIDE 9 MG/ML
INJECTION, SOLUTION INTRAVENOUS CONTINUOUS
Status: DISCONTINUED | OUTPATIENT
Start: 2023-09-08 | End: 2023-09-10

## 2023-09-08 RX ORDER — FLUMAZENIL 0.1 MG/ML
0.2 INJECTION, SOLUTION INTRAVENOUS
Status: DISCONTINUED | OUTPATIENT
Start: 2023-09-08 | End: 2023-09-08

## 2023-09-08 RX ORDER — GABAPENTIN 300 MG/1
600 CAPSULE ORAL EVERY 8 HOURS
Status: DISCONTINUED | OUTPATIENT
Start: 2023-09-12 | End: 2023-09-10 | Stop reason: HOSPADM

## 2023-09-08 RX ORDER — CLONIDINE HYDROCHLORIDE 0.1 MG/1
0.1 TABLET ORAL EVERY 8 HOURS
Status: DISCONTINUED | OUTPATIENT
Start: 2023-09-08 | End: 2023-09-09

## 2023-09-08 RX ORDER — GABAPENTIN 100 MG/1
100 CAPSULE ORAL EVERY 8 HOURS
Status: DISCONTINUED | OUTPATIENT
Start: 2023-09-16 | End: 2023-09-10 | Stop reason: HOSPADM

## 2023-09-08 RX ORDER — MULTIPLE VITAMINS W/ MINERALS TAB 9MG-400MCG
1 TAB ORAL DAILY
Status: DISCONTINUED | OUTPATIENT
Start: 2023-09-09 | End: 2023-09-08

## 2023-09-08 RX ORDER — OLANZAPINE 5 MG/1
5-10 TABLET, ORALLY DISINTEGRATING ORAL EVERY 6 HOURS PRN
Status: DISCONTINUED | OUTPATIENT
Start: 2023-09-08 | End: 2023-09-10

## 2023-09-08 RX ORDER — ONDANSETRON 4 MG/1
4 TABLET, ORALLY DISINTEGRATING ORAL EVERY 6 HOURS PRN
Status: DISCONTINUED | OUTPATIENT
Start: 2023-09-08 | End: 2023-09-10 | Stop reason: HOSPADM

## 2023-09-08 RX ORDER — DIAZEPAM 5 MG
5-20 TABLET ORAL EVERY 30 MIN PRN
Status: DISCONTINUED | OUTPATIENT
Start: 2023-09-08 | End: 2023-09-09

## 2023-09-08 RX ORDER — CLONIDINE HYDROCHLORIDE 0.1 MG/1
0.1 TABLET ORAL EVERY 8 HOURS
Status: DISCONTINUED | OUTPATIENT
Start: 2023-09-08 | End: 2023-09-08

## 2023-09-08 RX ORDER — POTASSIUM CHLORIDE 20MEQ/15ML
40 LIQUID (ML) ORAL ONCE
Status: COMPLETED | OUTPATIENT
Start: 2023-09-08 | End: 2023-09-08

## 2023-09-08 RX ORDER — LIDOCAINE 40 MG/G
CREAM TOPICAL
Status: DISCONTINUED | OUTPATIENT
Start: 2023-09-08 | End: 2023-09-10 | Stop reason: HOSPADM

## 2023-09-08 RX ADMIN — HYDROMORPHONE HYDROCHLORIDE 0.5 MG: 1 INJECTION, SOLUTION INTRAMUSCULAR; INTRAVENOUS; SUBCUTANEOUS at 21:00

## 2023-09-08 RX ADMIN — Medication 5 MG: at 21:49

## 2023-09-08 RX ADMIN — THIAMINE HCL TAB 100 MG 100 MG: 100 TAB at 16:30

## 2023-09-08 RX ADMIN — SODIUM CHLORIDE: 9 INJECTION, SOLUTION INTRAVENOUS at 22:39

## 2023-09-08 RX ADMIN — FOLIC ACID 1 MG: 1 TABLET ORAL at 16:30

## 2023-09-08 RX ADMIN — GABAPENTIN 1200 MG: 600 TABLET, FILM COATED ORAL at 20:55

## 2023-09-08 RX ADMIN — PANTOPRAZOLE SODIUM 40 MG: 40 INJECTION, POWDER, FOR SOLUTION INTRAVENOUS at 20:55

## 2023-09-08 RX ADMIN — FOLIC ACID: 5 INJECTION, SOLUTION INTRAMUSCULAR; INTRAVENOUS; SUBCUTANEOUS at 20:26

## 2023-09-08 RX ADMIN — DIAZEPAM 10 MG: 5 TABLET ORAL at 13:51

## 2023-09-08 RX ADMIN — POTASSIUM CHLORIDE 10 MEQ: 7.46 INJECTION, SOLUTION INTRAVENOUS at 17:18

## 2023-09-08 RX ADMIN — MULTIPLE VITAMINS W/ MINERALS TAB 1 TABLET: TAB at 16:30

## 2023-09-08 RX ADMIN — PANTOPRAZOLE SODIUM 80 MG: 40 INJECTION, POWDER, FOR SOLUTION INTRAVENOUS at 13:51

## 2023-09-08 RX ADMIN — SODIUM CHLORIDE 1000 ML: 9 INJECTION, SOLUTION INTRAVENOUS at 13:50

## 2023-09-08 RX ADMIN — ONDANSETRON 4 MG: 2 INJECTION INTRAMUSCULAR; INTRAVENOUS at 13:51

## 2023-09-08 RX ADMIN — POTASSIUM CHLORIDE 40 MEQ: 40 SOLUTION ORAL at 17:14

## 2023-09-08 RX ADMIN — LORAZEPAM 1 MG: 1 TABLET ORAL at 21:49

## 2023-09-08 RX ADMIN — CLONIDINE HYDROCHLORIDE 0.1 MG: 0.1 TABLET ORAL at 20:55

## 2023-09-08 RX ADMIN — HYDROMORPHONE HYDROCHLORIDE 0.5 MG: 1 INJECTION, SOLUTION INTRAMUSCULAR; INTRAVENOUS; SUBCUTANEOUS at 17:11

## 2023-09-08 ASSESSMENT — ACTIVITIES OF DAILY LIVING (ADL)
ADLS_ACUITY_SCORE: 35

## 2023-09-08 NOTE — ED PROVIDER NOTES
[facility] EMERGENCY DEPARTMENT ([facility])    9/08/23         History     Chief Complaint   Patient presents with    Addiction Problem     From treatment for alcohol and fentanyl withdrawal. Last drink last night, last fentanyl use was this morning. Pt reporting joint pain, abdominal pain, N/V      The history is provided by the patient and medical records.     Danny Rico is a 27 year old male with history of alcohol use disorder and opioid use disorder who presents to the Emergency Department seeking detox from alcohol and fentanyl. Patient reports he has been drinking 1L per day for the past 3 years. He has had brief periods of sobriety. Patient endorses history of alcohol withdrawal seizures in the past. He notes he last had a seizure about 4 months ago and was hospitalized at that time. Patient reports he has been smoking fentanyl as well. He uses about 5-10 pills per day. His last use was this morning. His last drink was last night. Patient reports he had a bed at a treatment facility, however, started feeling unwell and was sent here to the ED. Patient reports he is having diffuse abdominal pain and low back pain. He reports nausea and vomiting and has been vomiting blood.    Past Medical History  History reviewed. No pertinent past medical history.  History reviewed. No pertinent surgical history.  benzonatate (TESSALON) 200 MG capsule      No Known Allergies  Family History  History reviewed. No pertinent family history.  Social History   Social History     Tobacco Use    Smoking status: Some Days     Types: Cigarettes   Substance Use Topics    Alcohol use: Yes     Comment: Alcoholic Drinks/day: .75 liter of violette a day    Drug use: Not Currently     Types: Marijuana     Comment: Drug use: quit 02/2019         A medically appropriate review of systems was performed with pertinent positives and negatives noted in the HPI, and all other systems negative.    Physical Exam   BP: (!)  157/47  Pulse: 91  Temp: 98.7  F (37.1  C)  Resp: 18  SpO2: 99 %  Physical Exam  Vitals reviewed.   Constitutional:       General: He is not in acute distress.     Appearance: He is not diaphoretic.   HENT:      Head: Normocephalic and atraumatic.      Right Ear: External ear normal.      Left Ear: External ear normal.      Nose: Nose normal. No rhinorrhea.      Mouth/Throat:      Mouth: Mucous membranes are moist.   Eyes:      General: No scleral icterus.     Extraocular Movements: Extraocular movements intact.      Conjunctiva/sclera: Conjunctivae normal.   Cardiovascular:      Rate and Rhythm: Normal rate and regular rhythm.      Heart sounds: Normal heart sounds.   Pulmonary:      Effort: No respiratory distress.      Breath sounds: Normal breath sounds.   Abdominal:      Palpations: Abdomen is soft.      Tenderness: There is abdominal tenderness.   Musculoskeletal:         General: No deformity. Normal range of motion.      Cervical back: Normal range of motion and neck supple.   Skin:     General: Skin is warm.      Findings: No rash.   Neurological:      Mental Status: Mental status is at baseline.   Psychiatric:         Mood and Affect: Mood normal.         Behavior: Behavior normal.         ED Course, Procedures, & Data      1:33 PM  The patient was seen and examined by Vick Ghosh DO in [facility].   Procedures            Results for orders placed or performed during the hospital encounter of 09/08/23   Comprehensive metabolic panel     Status: Abnormal   Result Value Ref Range    Sodium 130 (L) 136 - 145 mmol/L    Potassium 2.8 (L) 3.4 - 5.3 mmol/L    Chloride 82 (L) 98 - 107 mmol/L    Carbon Dioxide (CO2) 26 22 - 29 mmol/L    Anion Gap 22 (H) 7 - 15 mmol/L    Urea Nitrogen 7.6 6.0 - 20.0 mg/dL    Creatinine 0.60 (L) 0.67 - 1.17 mg/dL    Calcium 10.3 (H) 8.6 - 10.0 mg/dL    Glucose 98 70 - 99 mg/dL    Alkaline Phosphatase 116 40 - 129 U/L     (HH) 0 - 45 U/L     (H) 0 - 70 U/L     Protein Total 8.7 (H) 6.4 - 8.3 g/dL    Albumin 5.0 3.5 - 5.2 g/dL    Bilirubin Total 1.8 (H) <=1.2 mg/dL    GFR Estimate >90 >60 mL/min/1.73m2   Lipase     Status: Abnormal   Result Value Ref Range    Lipase 925 (H) 13 - 60 U/L   CBC with platelets and differential     Status: Abnormal   Result Value Ref Range    WBC Count 5.0 4.0 - 11.0 10e3/uL    RBC Count 4.04 (L) 4.40 - 5.90 10e6/uL    Hemoglobin 13.3 13.3 - 17.7 g/dL    Hematocrit 36.6 (L) 40.0 - 53.0 %    MCV 91 78 - 100 fL    MCH 32.9 26.5 - 33.0 pg    MCHC 36.3 31.5 - 36.5 g/dL    RDW 11.5 10.0 - 15.0 %    Platelet Count 120 (L) 150 - 450 10e3/uL   Manual Differential     Status: Abnormal   Result Value Ref Range    % Neutrophils 89 %    % Lymphocytes 6 %    % Monocytes 5 %    % Eosinophils 0 %    % Basophils 0 %    Absolute Neutrophils 4.5 1.6 - 8.3 10e3/uL    Absolute Lymphocytes 0.3 (L) 0.8 - 5.3 10e3/uL    Absolute Monocytes 0.3 0.0 - 1.3 10e3/uL    Absolute Eosinophils 0.0 0.0 - 0.7 10e3/uL    Absolute Basophils 0.0 0.0 - 0.2 10e3/uL    RBC Morphology Confirmed RBC Indices     Platelet Assessment  Automated Count Confirmed. Platelet morphology is normal.     Automated Count Confirmed. Platelet morphology is normal.   Alcohol breath test POCT     Status: Normal   Result Value Ref Range    Alcohol Breath Test 0 0.00 - 0.01   CBC with platelets differential     Status: Abnormal    Narrative    The following orders were created for panel order CBC with platelets differential.  Procedure                               Abnormality         Status                     ---------                               -----------         ------                     CBC with platelets and d...[[id number]]  Abnormal            Final result               Manual Differential[[id number]]          Abnormal            Final result                 Please view results for these tests on the individual orders.            Results for orders placed or performed during the hospital  encounter of 09/08/23   Comprehensive metabolic panel     Status: Abnormal   Result Value Ref Range    Sodium 130 (L) 136 - 145 mmol/L    Potassium 2.8 (L) 3.4 - 5.3 mmol/L    Chloride 82 (L) 98 - 107 mmol/L    Carbon Dioxide (CO2) 26 22 - 29 mmol/L    Anion Gap 22 (H) 7 - 15 mmol/L    Urea Nitrogen 7.6 6.0 - 20.0 mg/dL    Creatinine 0.60 (L) 0.67 - 1.17 mg/dL    Calcium 10.3 (H) 8.6 - 10.0 mg/dL    Glucose 98 70 - 99 mg/dL    Alkaline Phosphatase 116 40 - 129 U/L     (HH) 0 - 45 U/L     (H) 0 - 70 U/L    Protein Total 8.7 (H) 6.4 - 8.3 g/dL    Albumin 5.0 3.5 - 5.2 g/dL    Bilirubin Total 1.8 (H) <=1.2 mg/dL    GFR Estimate >90 >60 mL/min/1.73m2   Lipase     Status: Abnormal   Result Value Ref Range    Lipase 925 (H) 13 - 60 U/L   CBC with platelets and differential     Status: Abnormal   Result Value Ref Range    WBC Count 5.0 4.0 - 11.0 10e3/uL    RBC Count 4.04 (L) 4.40 - 5.90 10e6/uL    Hemoglobin 13.3 13.3 - 17.7 g/dL    Hematocrit 36.6 (L) 40.0 - 53.0 %    MCV 91 78 - 100 fL    MCH 32.9 26.5 - 33.0 pg    MCHC 36.3 31.5 - 36.5 g/dL    RDW 11.5 10.0 - 15.0 %    Platelet Count 120 (L) 150 - 450 10e3/uL   Manual Differential     Status: Abnormal   Result Value Ref Range    % Neutrophils 89 %    % Lymphocytes 6 %    % Monocytes 5 %    % Eosinophils 0 %    % Basophils 0 %    Absolute Neutrophils 4.5 1.6 - 8.3 10e3/uL    Absolute Lymphocytes 0.3 (L) 0.8 - 5.3 10e3/uL    Absolute Monocytes 0.3 0.0 - 1.3 10e3/uL    Absolute Eosinophils 0.0 0.0 - 0.7 10e3/uL    Absolute Basophils 0.0 0.0 - 0.2 10e3/uL    RBC Morphology Confirmed RBC Indices     Platelet Assessment  Automated Count Confirmed. Platelet morphology is normal.     Automated Count Confirmed. Platelet morphology is normal.   Alcohol breath test POCT     Status: Normal   Result Value Ref Range    Alcohol Breath Test 0 0.00 - 0.01   CBC with platelets differential     Status: Abnormal    Narrative    The following orders were created for panel  order CBC with platelets differential.  Procedure                               Abnormality         Status                     ---------                               -----------         ------                     CBC with platelets and d...[[id number]]  Abnormal            Final result               Manual Differential[[id number]]          Abnormal            Final result                 Please view results for these tests on the individual orders.     Medications   ondansetron (ZOFRAN) injection 4 mg (4 mg Intravenous $Given 9/8/23 1351)   diazepam (VALIUM) tablet 5-20 mg (10 mg Oral $Given 9/8/23 1351)   multivitamin w/minerals (THERA-VIT-M) tablet 1 tablet (has no administration in time range)   folic acid (FOLVITE) tablet 1 mg (has no administration in time range)   thiamine (B-1) tablet 100 mg (has no administration in time range)   potassium chloride (KAYCIEL) solution 40 mEq (has no administration in time range)   potassium chloride 10 mEq in 100 mL sterile water infusion (has no administration in time range)   HYDROmorphone (PF) (DILAUDID) injection 0.5 mg (has no administration in time range)   0.9% sodium chloride BOLUS (1,000 mLs Intravenous $New Bag 9/8/23 1350)   pantoprazole (PROTONIX) IV push injection 80 mg (80 mg Intravenous $Given 9/8/23 1351)     Labs Ordered and Resulted from Time of ED Arrival to Time of ED Departure   COMPREHENSIVE METABOLIC PANEL - Abnormal       Result Value    Sodium 130 (*)     Potassium 2.8 (*)     Chloride 82 (*)     Carbon Dioxide (CO2) 26      Anion Gap 22 (*)     Urea Nitrogen 7.6      Creatinine 0.60 (*)     Calcium 10.3 (*)     Glucose 98      Alkaline Phosphatase 116       (*)      (*)     Protein Total 8.7 (*)     Albumin 5.0      Bilirubin Total 1.8 (*)     GFR Estimate >90     LIPASE - Abnormal    Lipase 925 (*)    CBC WITH PLATELETS AND DIFFERENTIAL - Abnormal    WBC Count 5.0      RBC Count 4.04 (*)     Hemoglobin 13.3      Hematocrit 36.6 (*)      MCV 91      MCH 32.9      MCHC 36.3      RDW 11.5      Platelet Count 120 (*)    DIFFERENTIAL - Abnormal    % Neutrophils 89      % Lymphocytes 6      % Monocytes 5      % Eosinophils 0      % Basophils 0      Absolute Neutrophils 4.5      Absolute Lymphocytes 0.3 (*)     Absolute Monocytes 0.3      Absolute Eosinophils 0.0      Absolute Basophils 0.0      RBC Morphology Confirmed RBC Indices      Platelet Assessment        Value: Automated Count Confirmed. Platelet morphology is normal.   ALCOHOL BREATH TEST POCT - Normal    Alcohol Breath Test 0     MAGNESIUM   ALCOHOL BREATH TEST POCT     No orders to display          Medical Decision Making  The patient's presentation is strongly suggestive of high complexity (a chronic illness severe exacerbation, progression, or side effect of treatment).    The patient's evaluation involved:  review of external note(s) from 3+ sources (Most recent H&P in addition to clinic and ED note)  review of 2 test result(s) ordered prior to this encounter (Most recent BMP and CBC)    The patient's management involved high risk (a decision regarding hospitalization).    Assessment & Plan    27-year-old male with a history of of heavy alcohol use as well as opiate use presents to us with nausea vomiting abdominal pain.  Vital signs initially were unremarkable.  He reports his last drink was approximately 12 hours ago.  We did order Valium to help with withdrawals.  Labs are significant for elevated LFTs in addition to severely elevated lipase consistent with pancreatitis.  His potassium was low as well and so replacement was ordered.  He was given IV fluids as well as nausea control.  We will admit him to the hospital.    I have reviewed the nursing notes. I have reviewed the findings, diagnosis, plan and need for follow up with the patient.    New Prescriptions    No medications on file       Final diagnoses:   Alcohol-induced acute pancreatitis, unspecified complication status   Alcohol  dependence with uncomplicated withdrawal (H)     I, Bhargavi Ellis, am serving as a trained medical scribe to document services personally performed by  Vick Ghosh DO, based on the provider's statements to me.      I, Vick Ghosh DO, was physically present and have reviewed and verified the accuracy of this note documented by Bhargavi Ellis.     Vick Ghosh DO  [facility] EMERGENCY DEPARTMENT  9/8/2023     Vick Ghosh DO  09/08/23 1512

## 2023-09-08 NOTE — ED NOTES
Patient reports using about 5-10 pills of fentanyl for 2-3 months, last used this morning. Drinking 1L daily for about 3 years, last drink about 0300 today. Reports history of withdrawal seizures, last one 4 months ago. Reports feeling body aches, nausea/vomiting, chest pain. States has severe pain in abdomen when he vomits. Treatment facility sent patient here due to withdrawal symptoms.

## 2023-09-08 NOTE — PHARMACY-ADMISSION MEDICATION HISTORY
Pharmacist Admission Medication History    Admission medication history is complete. The information provided in this note is only as accurate as the sources available at the time of the update.    Medication reconciliation/reorder completed by provider prior to medication history? No    Information Source(s): Patient and CareEverywhere/SureScripts via in-person    Pertinent Information: patient was prescribed acetaminophen, gabapentin, lidocaine ointment, Narcan, naltrexone, ondansetron, pantoprazole, and trazodone on discharge from [facility] July 3rd, but reports not taking any of these medications after discharge. He reports taking no prescription or OTC medications at this time.     Changes made to PTA medication list:  Added: None  Deleted: benzonatate  Changed: None    Medication Affordability: unable to assess     Allergies reviewed with patient and updates made in EHR: yes    Medication History Completed By: Janelle Vanegas RPH 9/8/2023 4:50 PM    Prior to Admission medications    Not on File

## 2023-09-08 NOTE — H&P
[facility] History and Physical  Department of Internal Medicine    Patient Name: Danny Rico MRN# [id number]   Age: 27 year old YOB: 1995     Date of Admission:9/8/2023    Primary care provider: No Ref-Primary, Physician  Date of Service: 9/8/2023  Admitting Team: swing shift         Assessment and Plan:   Danny Rico is a 27 year old male with past medical history of alcohol use disorder and dependence who presented to the ED for evaluation of abdominal pain and vomiting.         Acute alcoholic pancreatitis, uncomplicated.  Patient is actively feeling nauseous and vomiting.  Kept n.p.o. except for water, ice chips fluids.  We will advance diet as tolerated when able.  Continue IV fluids at 150 cc/h.  Acute alcoholic gastritis.  Possible Amrita-Larsen tear due to retching.  Received pantoprazole 80 mg IV x1 in the ED.  Continue pantoprazole 40 mg IV twice daily.  Abdominal pain, Dilaudid IV as needed.  Nausea with vomiting Zofran IV as needed.  EtOH withdrawal syndrome.  Placed on CIWA protocol  EtOH use disorder and dependence.  Seeking inpatient chemical dependency therapy  Thrombocytopenia due to EtOH use disorder  Hypokalemia, initial potassium level 2.8.  Replace and monitor per RN protocol.Monitor on telemetry  Hyponatremia likely due to alcohol/beer potomania.  We will monitor.  Hypomagnesemia.  Due to alcohol.  Replace and monitor per RN protocol  Abnormal LFTs.  , .  Compatible with alcoholic liver injury.      CODE: full code  Diet/IVF: NS with kcl 20 @ 150 ml/hr, NPO  DVT ppx: ambulate   Disposition/Admission Status: in pt    Valentina Cortez MD  Internal Medicine Staff Hospitalist  [facility]  Pager: [phone]       Chief Complaint:   Abdominal pain and vomiting          HPI:   27-year-old male with history of EtOH dependence, polysubstance use disorder.  He presented to the ED today with abdominal pain and vomiting and seeking inpatient  chemical dependency treatment.  Patient states he is on scheduled to be admitted to an inpatient chemical dependency treatment next week.  His last drink was today around 3 AM in the morning.  States he has been feeling nauseous, vomited multiple times.  He did have a blood streaked vomitus at the end of vomiting.  Denies any emesis of coffee-ground material or lisa hematemesis.  No any melena.  In the past he had alcohol binge drinking with alcoholic gastritis.  In the ED, patient awake alert oriented.  Vital signs stable.  Labs done showed hyponatremia with sodium level of 130 and hypokalemia with potassium of 2.8.  Normal serum creatinine level.  Serum calcium is 10.3 magnesium 1.4  with  compatible with alcoholic liver disease and alcoholic liver injury.  Lipase 925.  Normal WBC count and hemoglobin.  His platelet count 120.  Patient was given IV pain medications and antiemetics as well as pantoprazole 80 mg.  He did also receive potassium chloride 40 mg p.o. and IV potassium per RN protocol.  He is being admitted for alcohol withdrawal and treatment of electrolyte disorders and for pain management.         Past Medical History:   Alcoholic gastritis, sexually transmitted diseases/chlamydia.  Mild epigastric         Past Surgical History:   History reviewed. No pertinent surgical history.           Social History:   reviewed  Social History     Socioeconomic History    Marital status: Single     Spouse name: Not on file    Number of children: Not on file    Years of education: Not on file    Highest education level: Not on file   Occupational History    Not on file   Tobacco Use    Smoking status: Some Days     Types: Cigarettes    Smokeless tobacco: Not on file   Substance and Sexual Activity    Alcohol use: Yes     Comment: Alcoholic Drinks/day: .75 liter of violette a day    Drug use: Not Currently     Types: Marijuana     Comment: Drug use: quit 02/2019    Sexual activity: Not on file   Other  Topics Concern    Not on file   Social History Narrative    Not on file     Social Determinants of Health     Financial Resource Strain: Not on file   Food Insecurity: Not on file   Transportation Needs: Not on file   Physical Activity: Not on file   Stress: Not on file   Social Connections: Not on file   Intimate Partner Violence: Not on file   Housing Stability: Not on file            Family History:   History reviewed. No pertinent family history.  reviewed         Immunizations:     Immunization History   Administered Date(s) Administered    DTAP (<7y) 02/23/1996, 04/10/1996, 07/10/1996, 11/26/1996, 11/19/1998    HIB (PRP-T) 02/23/1996, 04/10/1996, 07/10/1996, 11/26/1996    HepB 1995, 02/23/1996, 07/10/1996    MMR 11/26/1996, 04/26/2000    Meningococcal (Menomune ) 09/09/2008    OPV, trivalent, live 02/23/1996, 04/10/1996, 07/10/1996, 04/28/2000    TDAP Vaccine (Adacel) 09/09/2008, 06/04/2017              Allergies:    No Known Allergies         Medications:     Current Facility-Administered Medications:     sodium chloride 0.9 % infusion, , , ,     0.9% sodium chloride + KCl 20 mEq/L infusion, , Intravenous, Continuous, Valentina Cortez MD    cloNIDine (CATAPRES) tablet 0.1 mg, 0.1 mg, Oral, Q8H, Valentina Cortez MD    diazepam (VALIUM) tablet 5-20 mg, 5-20 mg, Oral, Q30 Min PRN, Vick Ghosh DO, 10 mg at 09/08/23 1351    flumazenil (ROMAZICON) injection 0.2 mg, 0.2 mg, Intravenous, q1 min prn, Valentina Cortez MD    folic acid (FOLVITE) tablet 1 mg, 1 mg, Oral, Daily, Vick Ghosh DO, 1 mg at 09/08/23 1630    [START ON 9/16/2023] gabapentin (NEURONTIN) capsule 100 mg, 100 mg, Oral, Q8H, Valentina Cortez MD    [START ON 9/14/2023] gabapentin (NEURONTIN) capsule 300 mg, 300 mg, Oral, Q8H, Valentina Cortez MD    [START ON 9/12/2023] gabapentin (NEURONTIN) capsule 600 mg, 600 mg, Oral, Q8H, Valentina Cortez MD    [START ON 9/9/2023] gabapentin (NEURONTIN) capsule 900 mg, 900  mg, Oral, Q8H, Valentina Cortez MD    gabapentin (NEURONTIN) tablet 1,200 mg, 1,200 mg, Oral, Once, Valentina Cortez MD    OLANZapine zydis (zyPREXA) ODT tab 5-10 mg, 5-10 mg, Oral, Q6H PRN **OR** haloperidol lactate (HALDOL) injection 2.5-5 mg, 2.5-5 mg, Intravenous, Q6H PRN, Valentina Cortez MD    HYDROmorphone (PF) (DILAUDID) injection 0.5 mg, 0.5 mg, Intravenous, Q30 Min PRN, Vick Ghosh DO, 0.5 mg at 09/08/23 1711    HYDROmorphone (PF) (DILAUDID) injection 0.5 mg, 0.5 mg, Intravenous, Q2H PRN, Valentina Cortez MD    lidocaine (LMX4) cream, , Topical, Q1H PRN, Valentina Cortez MD    lidocaine 1 % 0.1-1 mL, 0.1-1 mL, Other, Q1H PRN, Valentina Cortez MD    LORazepam (ATIVAN) tablet 1-2 mg, 1-2 mg, Oral, Q30 Min PRN **OR** LORazepam (ATIVAN) injection 1-2 mg, 1-2 mg, Intravenous, Q30 Min PRN, Valentina Cortez MD    magnesium sulfate 2 g in 50 mL sterile water intermittent infusion, 2 g, Intravenous, Once, Kamlesh Darling MD    melatonin tablet 1 mg, 1 mg, Oral, At Bedtime PRN, Valentina Cortez MD    melatonin tablet 5 mg, 5 mg, Oral, QPM PRN, Valentina Cortez MD    multivitamin w/minerals (THERA-VIT-M) tablet 1 tablet, 1 tablet, Oral, Daily, Vick Ghosh DO, 1 tablet at 09/08/23 1630    ondansetron (ZOFRAN ODT) ODT tab 4 mg, 4 mg, Oral, Q6H PRN **OR** ondansetron (ZOFRAN) injection 4 mg, 4 mg, Intravenous, Q6H PRN, Valentina Cortez MD    pantoprazole (PROTONIX) IV push injection 40 mg, 40 mg, Intravenous, BID, Valentina Cortez MD    sodium chloride (PF) 0.9% PF flush 3 mL, 3 mL, Intracatheter, Q8H, Valentina Cortez MD    sodium chloride (PF) 0.9% PF flush 3 mL, 3 mL, Intracatheter, q1 min prn, Valentina Cortez MD    sodium chloride 0.9 % 1,000 mL with Infuvite Adult 10 mL, thiamine 100 mg, folic acid 1 mg infusion, , Intravenous, Once, Valentina Cortez MD    thiamine (B-1) tablet 100 mg, 100 mg, Oral, Daily, Vick Ghosh DO, 100 mg at 09/08/23 1630  No  current outpatient medications on file.              Review of Systems:   A complete ROS was performed and is negative other than what is stated in the HPI.          Physical Exam:   BP (!) 157/47   Pulse 91   Temp 98.7  F (37.1  C)   Resp 18   SpO2 99%      GENERAL: Alert and oriented x 3. NAD.   HEENT: Anicteric sclera. Mucous membranes moist.   CV: RRR. S1, S2. No murmurs appreciated.   RESPIRATORY: Effort normal on RA. Lungs CTAB with no wheezing, rales, rhonchi.   GI: Abdomen soft and non distended with normoactive bowel sounds present in all quadrants, mild epigastric tenderness,  no rebound, guarding.   NEUROLOGICAL: No focal deficits. Moves all extremities.    EXTREMITIES: No peripheral edema. Intact bilateral pedal pulses.   SKIN: No jaundice. No rashes.          Data:   CBC RESULTS:   Recent Labs   Lab Test 09/08/23  1350   WBC 5.0   RBC 4.04*   HGB 13.3   HCT 36.6*   MCV 91   MCH 32.9   MCHC 36.3   RDW 11.5   *   Last Comprehensive Metabolic Panel:  Lab Results   Component Value Date     (L) 09/08/2023    POTASSIUM 2.8 (L) 09/08/2023    CHLORIDE 82 (L) 09/08/2023    CO2 26 09/08/2023    ANIONGAP 22 (H) 09/08/2023    GLC 98 09/08/2023    BUN 7.6 09/08/2023    CR 0.60 (L) 09/08/2023    GFRESTIMATED >90 09/08/2023    MIGUEL 10.3 (H) 09/08/2023     Liver Function Studies -   Recent Labs   Lab Test 09/08/23  1350   PROTTOTAL 8.7*   ALBUMIN 5.0   BILITOTAL 1.8*   ALKPHOS 116   *   *

## 2023-09-09 PROBLEM — E87.6 HYPOKALEMIA: Status: ACTIVE | Noted: 2023-09-09

## 2023-09-09 PROBLEM — E83.42 HYPOMAGNESEMIA: Status: ACTIVE | Noted: 2023-09-09

## 2023-09-09 LAB
ALBUMIN SERPL BCG-MCNC: 4 G/DL (ref 3.5–5.2)
ALP SERPL-CCNC: 88 U/L (ref 40–129)
ALT SERPL W P-5'-P-CCNC: 99 U/L (ref 0–70)
ANION GAP SERPL CALCULATED.3IONS-SCNC: 13 MMOL/L (ref 7–15)
AST SERPL W P-5'-P-CCNC: 325 U/L (ref 0–45)
BASOPHILS # BLD AUTO: 0 10E3/UL (ref 0–0.2)
BASOPHILS NFR BLD AUTO: 0 %
BILIRUB SERPL-MCNC: 1.1 MG/DL
BUN SERPL-MCNC: 6 MG/DL (ref 6–20)
CALCIUM SERPL-MCNC: 9.7 MG/DL (ref 8.6–10)
CHLORIDE SERPL-SCNC: 97 MMOL/L (ref 98–107)
CREAT SERPL-MCNC: 0.68 MG/DL (ref 0.67–1.17)
DEPRECATED HCO3 PLAS-SCNC: 26 MMOL/L (ref 22–29)
EGFRCR SERPLBLD CKD-EPI 2021: >90 ML/MIN/1.73M2
EOSINOPHIL # BLD AUTO: 0.1 10E3/UL (ref 0–0.7)
EOSINOPHIL NFR BLD AUTO: 1 %
ERYTHROCYTE [DISTWIDTH] IN BLOOD BY AUTOMATED COUNT: 11.6 % (ref 10–15)
GLUCOSE SERPL-MCNC: 91 MG/DL (ref 70–99)
HCT VFR BLD AUTO: 33.4 % (ref 40–53)
HGB BLD-MCNC: 11.9 G/DL (ref 13.3–17.7)
IMM GRANULOCYTES # BLD: 0 10E3/UL
IMM GRANULOCYTES NFR BLD: 0 %
LYMPHOCYTES # BLD AUTO: 0.8 10E3/UL (ref 0.8–5.3)
LYMPHOCYTES NFR BLD AUTO: 14 %
MAGNESIUM SERPL-MCNC: 1.5 MG/DL (ref 1.7–2.3)
MCH RBC QN AUTO: 33.1 PG (ref 26.5–33)
MCHC RBC AUTO-ENTMCNC: 35.6 G/DL (ref 31.5–36.5)
MCV RBC AUTO: 93 FL (ref 78–100)
MONOCYTES # BLD AUTO: 0.3 10E3/UL (ref 0–1.3)
MONOCYTES NFR BLD AUTO: 7 %
NEUTROPHILS # BLD AUTO: 4 10E3/UL (ref 1.6–8.3)
NEUTROPHILS NFR BLD AUTO: 78 %
NRBC # BLD AUTO: 0 10E3/UL
NRBC BLD AUTO-RTO: 0 /100
PLATELET # BLD AUTO: 106 10E3/UL (ref 150–450)
POTASSIUM SERPL-SCNC: 2.8 MMOL/L (ref 3.4–5.3)
PROT SERPL-MCNC: 6.9 G/DL (ref 6.4–8.3)
RBC # BLD AUTO: 3.6 10E6/UL (ref 4.4–5.9)
SODIUM SERPL-SCNC: 136 MMOL/L (ref 136–145)
WBC # BLD AUTO: 5.2 10E3/UL (ref 4–11)

## 2023-09-09 PROCEDURE — 250N000013 HC RX MED GY IP 250 OP 250 PS 637: Performed by: PHYSICIAN ASSISTANT

## 2023-09-09 PROCEDURE — 80053 COMPREHEN METABOLIC PANEL: CPT | Performed by: INTERNAL MEDICINE

## 2023-09-09 PROCEDURE — 250N000013 HC RX MED GY IP 250 OP 250 PS 637: Performed by: INTERNAL MEDICINE

## 2023-09-09 PROCEDURE — 258N000003 HC RX IP 258 OP 636: Performed by: INTERNAL MEDICINE

## 2023-09-09 PROCEDURE — 83735 ASSAY OF MAGNESIUM: CPT | Performed by: INTERNAL MEDICINE

## 2023-09-09 PROCEDURE — 36415 COLL VENOUS BLD VENIPUNCTURE: CPT | Performed by: INTERNAL MEDICINE

## 2023-09-09 PROCEDURE — 99232 SBSQ HOSP IP/OBS MODERATE 35: CPT | Performed by: INTERNAL MEDICINE

## 2023-09-09 PROCEDURE — G0378 HOSPITAL OBSERVATION PER HR: HCPCS

## 2023-09-09 PROCEDURE — 250N000013 HC RX MED GY IP 250 OP 250 PS 637: Performed by: NURSE PRACTITIONER

## 2023-09-09 PROCEDURE — 85025 COMPLETE CBC W/AUTO DIFF WBC: CPT | Performed by: INTERNAL MEDICINE

## 2023-09-09 PROCEDURE — C9113 INJ PANTOPRAZOLE SODIUM, VIA: HCPCS | Mod: JZ | Performed by: INTERNAL MEDICINE

## 2023-09-09 PROCEDURE — 250N000011 HC RX IP 250 OP 636: Mod: JZ | Performed by: PHYSICIAN ASSISTANT

## 2023-09-09 PROCEDURE — 250N000011 HC RX IP 250 OP 636: Mod: JZ | Performed by: INTERNAL MEDICINE

## 2023-09-09 PROCEDURE — 96375 TX/PRO/DX INJ NEW DRUG ADDON: CPT

## 2023-09-09 PROCEDURE — 250N000013 HC RX MED GY IP 250 OP 250 PS 637: Performed by: EMERGENCY MEDICINE

## 2023-09-09 PROCEDURE — 99253 IP/OBS CNSLTJ NEW/EST LOW 45: CPT | Performed by: NURSE PRACTITIONER

## 2023-09-09 RX ORDER — NALOXONE HYDROCHLORIDE 0.4 MG/ML
0.4 INJECTION, SOLUTION INTRAMUSCULAR; INTRAVENOUS; SUBCUTANEOUS
Status: DISCONTINUED | OUTPATIENT
Start: 2023-09-09 | End: 2023-09-10 | Stop reason: HOSPADM

## 2023-09-09 RX ORDER — POTASSIUM CHLORIDE 20MEQ/15ML
40 LIQUID (ML) ORAL ONCE
Status: COMPLETED | OUTPATIENT
Start: 2023-09-09 | End: 2023-09-09

## 2023-09-09 RX ORDER — POTASSIUM CHLORIDE 20MEQ/15ML
20 LIQUID (ML) ORAL ONCE
Status: COMPLETED | OUTPATIENT
Start: 2023-09-09 | End: 2023-09-09

## 2023-09-09 RX ORDER — NALOXONE HYDROCHLORIDE 0.4 MG/ML
0.2 INJECTION, SOLUTION INTRAMUSCULAR; INTRAVENOUS; SUBCUTANEOUS
Status: DISCONTINUED | OUTPATIENT
Start: 2023-09-09 | End: 2023-09-10 | Stop reason: HOSPADM

## 2023-09-09 RX ORDER — ESCITALOPRAM OXALATE 10 MG/1
10 TABLET ORAL DAILY
Status: DISCONTINUED | OUTPATIENT
Start: 2023-09-09 | End: 2023-09-10 | Stop reason: HOSPADM

## 2023-09-09 RX ORDER — CLONIDINE HYDROCHLORIDE 0.1 MG/1
0.1 TABLET ORAL 2 TIMES DAILY
Status: DISCONTINUED | OUTPATIENT
Start: 2023-09-09 | End: 2023-09-10

## 2023-09-09 RX ORDER — MAGNESIUM SULFATE HEPTAHYDRATE 40 MG/ML
4 INJECTION, SOLUTION INTRAVENOUS ONCE
Status: COMPLETED | OUTPATIENT
Start: 2023-09-09 | End: 2023-09-09

## 2023-09-09 RX ORDER — PANTOPRAZOLE SODIUM 40 MG/1
40 TABLET, DELAYED RELEASE ORAL
Status: DISCONTINUED | OUTPATIENT
Start: 2023-09-09 | End: 2023-09-10 | Stop reason: HOSPADM

## 2023-09-09 RX ORDER — GABAPENTIN 600 MG/1
300 TABLET ORAL
Status: DISCONTINUED | OUTPATIENT
Start: 2023-09-09 | End: 2023-09-10 | Stop reason: HOSPADM

## 2023-09-09 RX ADMIN — POTASSIUM CHLORIDE 40 MEQ: 40 SOLUTION ORAL at 18:55

## 2023-09-09 RX ADMIN — GABAPENTIN 900 MG: 300 CAPSULE ORAL at 03:52

## 2023-09-09 RX ADMIN — THIAMINE HCL TAB 100 MG 100 MG: 100 TAB at 10:50

## 2023-09-09 RX ADMIN — Medication 5 MG: at 20:41

## 2023-09-09 RX ADMIN — FOLIC ACID 1 MG: 1 TABLET ORAL at 10:50

## 2023-09-09 RX ADMIN — CLONIDINE HYDROCHLORIDE 0.1 MG: 0.1 TABLET ORAL at 20:41

## 2023-09-09 RX ADMIN — ESCITALOPRAM OXALATE 10 MG: 10 TABLET ORAL at 13:42

## 2023-09-09 RX ADMIN — SODIUM CHLORIDE: 9 INJECTION, SOLUTION INTRAVENOUS at 22:15

## 2023-09-09 RX ADMIN — CLONIDINE HYDROCHLORIDE 0.1 MG: 0.1 TABLET ORAL at 02:58

## 2023-09-09 RX ADMIN — PANTOPRAZOLE SODIUM 40 MG: 40 TABLET, DELAYED RELEASE ORAL at 17:05

## 2023-09-09 RX ADMIN — MULTIPLE VITAMINS W/ MINERALS TAB 1 TABLET: TAB at 10:50

## 2023-09-09 RX ADMIN — PANTOPRAZOLE SODIUM 40 MG: 40 INJECTION, POWDER, FOR SOLUTION INTRAVENOUS at 10:50

## 2023-09-09 RX ADMIN — LORAZEPAM 1 MG: 1 TABLET ORAL at 02:58

## 2023-09-09 RX ADMIN — GABAPENTIN 900 MG: 300 CAPSULE ORAL at 12:03

## 2023-09-09 RX ADMIN — CLONIDINE HYDROCHLORIDE 0.1 MG: 0.1 TABLET ORAL at 10:51

## 2023-09-09 RX ADMIN — MAGNESIUM SULFATE HEPTAHYDRATE 4 G: 40 INJECTION, SOLUTION INTRAVENOUS at 18:55

## 2023-09-09 RX ADMIN — SODIUM CHLORIDE: 9 INJECTION, SOLUTION INTRAVENOUS at 10:51

## 2023-09-09 RX ADMIN — POTASSIUM CHLORIDE 20 MEQ: 20 SOLUTION ORAL at 20:41

## 2023-09-09 ASSESSMENT — ACTIVITIES OF DAILY LIVING (ADL)
ADLS_ACUITY_SCORE: 18
ADLS_ACUITY_SCORE: 18
DIFFICULTY_EATING/SWALLOWING: NO
NUMBER_OF_TIMES_PATIENT_HAS_FALLEN_WITHIN_LAST_SIX_MONTHS: 1
DEPENDENT_IADLS:: INDEPENDENT
WEAR_GLASSES_OR_BLIND: NO
ADLS_ACUITY_SCORE: 35
ADLS_ACUITY_SCORE: 18
ADLS_ACUITY_SCORE: 35
CHANGE_IN_FUNCTIONAL_STATUS_SINCE_ONSET_OF_CURRENT_ILLNESS/INJURY: NO
ADLS_ACUITY_SCORE: 18
FALL_HISTORY_WITHIN_LAST_SIX_MONTHS: YES
DRESSING/BATHING_DIFFICULTY: NO
ADLS_ACUITY_SCORE: 18
CONCENTRATING,_REMEMBERING_OR_MAKING_DECISIONS_DIFFICULTY: NO
ADLS_ACUITY_SCORE: 18
TOILETING_ISSUES: NO
ADLS_ACUITY_SCORE: 18
WALKING_OR_CLIMBING_STAIRS_DIFFICULTY: NO
ADLS_ACUITY_SCORE: 18
DOING_ERRANDS_INDEPENDENTLY_DIFFICULTY: NO

## 2023-09-09 NOTE — PLAN OF CARE
Goal Outcome Evaluation:      Plan of Care Reviewed With: patient  Overall Patient Progress: improving  Shift: [id number]    VS: /72 (BP Location: Right arm)   Pulse 79   Temp 99.3  F (37.4  C) (Oral)   Resp 16   SpO2 99%     Pain: Denies any pain this shift.     Neuro: A&O x4, able to make needs known  Resp: Stable on room air, denies SOB or chest pain  Diet: Advanced to reg diet today, appetite still low but improving  Skin: Visible skin intact  LDA: L PIV, infusing 150mL/hr  Activity: Ind in room  Output: Voids adequately without difficulty   Additional info: Pt received IV magnesium and oral K. Recheck ordered.   Call light within reach     Plan:  plan of care ongoing

## 2023-09-09 NOTE — PLAN OF CARE
Goal Outcome Evaluation:      Plan of Care Reviewed With: patient    Overall Patient Progress: improvingOverall Patient Progress: improving    Outcome Evaluation: Pt plans to return to CD tx facility [facility] when medically stable.

## 2023-09-09 NOTE — CONSULTS
Care Management Initial Consult    General Information  Assessment completed with: Patient,    Type of CM/SW Visit: Initial Assessment    Primary Care Provider verified and updated as needed: No   Readmission within the last 30 days: no previous admission in last 30 days      Reason for Consult: discharge planning  Advance Care Planning: Advance Care Planning Reviewed: no concerns identified          Communication Assessment  Patient's communication style: spoken language (English or Bilingual)    Hearing Difficulty or Deaf: no   Wear Glasses or Blind: no    Cognitive  Cognitive/Neuro/Behavioral: WDL                      Living Environment:   People in home: alone     Current living Arrangements: homeless      Able to return to prior arrangements: other (see comments) (Plans to return to [facility] facility)       Family/Social Support:  Care provided by: self  Provides care for: no one  Marital Status: Single  None          Description of Support System: Uninvolved    Support Assessment: Limited social contact and support    Current Resources:   Patient receiving home care services: No     Community Resources: None  Equipment currently used at home: none  Supplies currently used at home: None    Employment/Financial:  Employment Status: unemployed        Financial Concerns: No concerns identified           Does the patient's insurance plan have a 3 day qualifying hospital stay waiver?  N/A    Lifestyle & Psychosocial Needs:  Social Determinants of Health     Tobacco Use: High Risk (9/8/2023)    Patient History     Smoking Tobacco Use: Some Days     Smokeless Tobacco Use: Unknown     Passive Exposure: Not on file   Alcohol Use: Not on file   Financial Resource Strain: Not on file   Food Insecurity: Not on file   Transportation Needs: Not on file   Physical Activity: Not on file   Stress: Not on file   Social Connections: Not on file   Intimate Partner Violence: Not on file   Depression: Not on file   Housing Stability:  Not on file       Functional Status:  Prior to admission patient needed assistance:   Dependent ADLs:: Independent  Dependent IADLs:: Independent  Assesssment of Functional Status: At functional baseline    Mental Health Status:  Mental Health Status: No Current Concerns       Chemical Dependency Status:  Chemical Dependency Status: Current Concern  Chemical Dependency Management: Previous treatment          Values/Beliefs:  Spiritual, Cultural Beliefs, Mandaen Practices, Values that affect care: no               Additional Information:  Danny Rico is a 27 year old male with past medical history of alcohol use disorder and dependence who presented to the ED for evaluation of abdominal pain and vomiting.     Met with patient in his hospital room.  Patient indicated he plans to return to [facility] when medically stable.  They are holding his bed and he indicated he can call them for a ride back at discharge.    Breanna Mondragon [facility]  Weekend     Text paging available through [employer] on [employer] - search SOCIAL WORK     Sat/Sun ON CALL PAGER   0800 - 1600   [phone]      Sat/Sun ON CALL COVERAGE AFTER 1600 - midnight [phone]

## 2023-09-09 NOTE — PROGRESS NOTES
Admitted/transferred from:   2 RN  skin assessment completed by Faiza Blanc RN and Rupa.  continue to monitor.    VS: /78   Pulse 96   Temp 99.7  F (37.6  C) (Oral)   Resp 17   SpO2 100%      O2: Sating >90% on RA. Denies chest pain and SOB.   Output: Voids spontaneously and adequately. Urinal at bedside    Last BM: LBM unknown    Activity: IND in room    Skin: Skin intact    Pain: Pain was managed with  Valium given twice this shift for headache and Tylenol  given.    CMS: A&Ox4. Denies N/T.    Dressing: N/A   Diet: NPO with exception of medications pt. On IV fluid  ml/hr    LDA: PIV to Left IV infusing    Equipment: IV pole, FWW, gait belt, and personal belongings. Call light within reach and uses appropriately.   Plan:  TBD  Treatment    Additional Info: Pt. Slept for most of the night. Pt. On CIWA checks and pt. Also on seizure precautions. Pt. Admitted yesterday around 1945 from [place]. Pt. Came in for ETOH. Continue POC

## 2023-09-09 NOTE — CONSULTS
"Triage and Transition - Consult and Liaison     Danny Rico  September 9, 2023    Psychiatry consult acknowledged.     Attempted consult remotely. Ipad notes \"no network connection\" and cannot be connected at this time.      Update onsite psychiatry provider regarding barriers to remote assessment and requested they complete assessment today.     DEJON ROSS MSW, [facility]  Triage and Transition - Consult and Liaison   [phone]    "

## 2023-09-09 NOTE — CONSULTS
Initial Psychiatric Consult   Consult date: September 9, 2023         Reason for Consult, requesting source:    Psychiatric consultation received.  Chart reviewed.  Patient seen at bedside by CARLTON Cantu CNP on 9/9/2023.    Follow up on 9/10/23.     Requesting source: Valentina Cortez    Labs and imaging reviewed.  Yes    Total time spent in chart review, patient interview and coordination of care; greater than 30 minutes.        HPI:   Per Dr. Augie DO  dated 9/8/23: Danny Rico is a 27 year old male with history of alcohol use disorder and opioid use disorder who presents to the Emergency Department seeking detox from alcohol and fentanyl. Patient reports he has been drinking 1L per day for the past 3 years. He has had brief periods of sobriety. Patient endorses history of alcohol withdrawal seizures in the past. He notes he last had a seizure about 4 months ago and was hospitalized at that time. Patient reports he has been smoking fentanyl as well. He uses about 5-10 pills per day. His last use was this morning. His last drink was last night. Patient reports he had a bed at a treatment facility, however, started feeling unwell and was sent here to the ED. Patient reports he is having diffuse abdominal pain and low back pain. He reports nausea and vomiting and has been vomiting blood. We did order Valium to help with withdrawals. Labs are significant for elevated LFTs in addition to severely elevated lipase consistent with pancreatitis. His potassium was low as well and so replacement was ordered. He was given IV fluids as well as nausea control. We will admit him to the hospital.     9/9/23: Patient reports that he was transported from his first day at [facility] to [facility] secondary to physical withdrawal.  Patient currently being treated on medical unit for alcohol and opiate use disorders.  Patient reports significant history of depression and anxiety for which he has  "never received treatment.        Past Psychiatric History:   Patient reports experiencing symptoms of depression and anxiety for approximately 5 years.  Patient has had no inpatient psychiatric treatment nor has been treated with any psychotropic medication.        Substance Use and History:     Patient has been drinking approximately 1 L of hard alcohol for 3 years.  History of alcohol withdrawal seizures.  Patient denied hallucinations secondary to alcohol withdrawal.  Patient also reports using approximately 5-10 fentanyl pills per day.  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    Social History     Tobacco Use    Smoking status: Some Days     Types: Cigarettes    Smokeless tobacco: Not on file   Substance Use Topics    Alcohol use: Yes     Comment: Alcoholic Drinks/day: .75 liter of violette a day           Past Medical History:   PAST MEDICAL HISTORY: History reviewed. No pertinent past medical history.    PAST SURGICAL HISTORY: History reviewed. No pertinent surgical history.          Family History:   FAMILY HISTORY: History reviewed. No pertinent family history.        Social History:     Patient reports that his mother  approximately 2 years ago and that he is currently homeless.  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  Patient has not yet graduated high school.  Patient could not list any social support networks other than previous treatment center.           Physical ROS:   The 10 point Review of Systems is negative other than noted in the HPI or here.         Medications:      cloNIDine  0.1 mg Oral Q8H    folic acid  1 mg Oral Daily    [START ON 2023] gabapentin  100 mg Oral Q8H    [START ON 2023] gabapentin  300 mg Oral Q8H    [START ON 2023] gabapentin  600 mg Oral Q8H    gabapentin  900 mg Oral Q8H    magnesium sulfate  2 g Intravenous Once    multivitamin w/minerals  1 tablet Oral " Daily    pantoprazole  40 mg Intravenous BID    sodium chloride (PF)  3 mL Intracatheter Q8H    thiamine  100 mg Oral Daily              Allergies:   No Known Allergies       Labs:     Recent Results (from the past 48 hour(s))   Comprehensive metabolic panel    Collection Time: 09/08/23  1:50 PM   Result Value Ref Range    Sodium 130 (L) 136 - 145 mmol/L    Potassium 2.8 (L) 3.4 - 5.3 mmol/L    Chloride 82 (L) 98 - 107 mmol/L    Carbon Dioxide (CO2) 26 22 - 29 mmol/L    Anion Gap 22 (H) 7 - 15 mmol/L    Urea Nitrogen 7.6 6.0 - 20.0 mg/dL    Creatinine 0.60 (L) 0.67 - 1.17 mg/dL    Calcium 10.3 (H) 8.6 - 10.0 mg/dL    Glucose 98 70 - 99 mg/dL    Alkaline Phosphatase 116 40 - 129 U/L     (HH) 0 - 45 U/L     (H) 0 - 70 U/L    Protein Total 8.7 (H) 6.4 - 8.3 g/dL    Albumin 5.0 3.5 - 5.2 g/dL    Bilirubin Total 1.8 (H) <=1.2 mg/dL    GFR Estimate >90 >60 mL/min/1.73m2   Lipase    Collection Time: 09/08/23  1:50 PM   Result Value Ref Range    Lipase 925 (H) 13 - 60 U/L   CBC with platelets and differential    Collection Time: 09/08/23  1:50 PM   Result Value Ref Range    WBC Count 5.0 4.0 - 11.0 10e3/uL    RBC Count 4.04 (L) 4.40 - 5.90 10e6/uL    Hemoglobin 13.3 13.3 - 17.7 g/dL    Hematocrit 36.6 (L) 40.0 - 53.0 %    MCV 91 78 - 100 fL    MCH 32.9 26.5 - 33.0 pg    MCHC 36.3 31.5 - 36.5 g/dL    RDW 11.5 10.0 - 15.0 %    Platelet Count 120 (L) 150 - 450 10e3/uL   Manual Differential    Collection Time: 09/08/23  1:50 PM   Result Value Ref Range    % Neutrophils 89 %    % Lymphocytes 6 %    % Monocytes 5 %    % Eosinophils 0 %    % Basophils 0 %    Absolute Neutrophils 4.5 1.6 - 8.3 10e3/uL    Absolute Lymphocytes 0.3 (L) 0.8 - 5.3 10e3/uL    Absolute Monocytes 0.3 0.0 - 1.3 10e3/uL    Absolute Eosinophils 0.0 0.0 - 0.7 10e3/uL    Absolute Basophils 0.0 0.0 - 0.2 10e3/uL    RBC Morphology Confirmed RBC Indices     Platelet Assessment  Automated Count Confirmed. Platelet morphology is normal.     Automated  Count Confirmed. Platelet morphology is normal.   Magnesium    Collection Time: 09/08/23  1:50 PM   Result Value Ref Range    Magnesium 1.4 (L) 1.7 - 2.3 mg/dL   Phosphorus    Collection Time: 09/08/23  1:50 PM   Result Value Ref Range    Phosphorus 2.9 2.5 - 4.5 mg/dL   Alcohol breath test POCT    Collection Time: 09/08/23  2:11 PM   Result Value Ref Range    Alcohol Breath Test 0 0.00 - 0.01   Osmolality    Collection Time: 09/08/23  6:26 PM   Result Value Ref Range    Osmolality Blood 272 (L) 275 - 295 mmol/kg   Magnesium    Collection Time: 09/08/23  6:26 PM   Result Value Ref Range    Magnesium 1.4 (L) 1.7 - 2.3 mg/dL   Magnesium    Collection Time: 09/09/23  8:27 AM   Result Value Ref Range    Magnesium 1.5 (L) 1.7 - 2.3 mg/dL   Comprehensive metabolic panel    Collection Time: 09/09/23  8:27 AM   Result Value Ref Range    Sodium 136 136 - 145 mmol/L    Potassium 2.8 (L) 3.4 - 5.3 mmol/L    Chloride 97 (L) 98 - 107 mmol/L    Carbon Dioxide (CO2) 26 22 - 29 mmol/L    Anion Gap 13 7 - 15 mmol/L    Urea Nitrogen 6.0 6.0 - 20.0 mg/dL    Creatinine 0.68 0.67 - 1.17 mg/dL    Calcium 9.7 8.6 - 10.0 mg/dL    Glucose 91 70 - 99 mg/dL    Alkaline Phosphatase 88 40 - 129 U/L     (H) 0 - 45 U/L    ALT 99 (H) 0 - 70 U/L    Protein Total 6.9 6.4 - 8.3 g/dL    Albumin 4.0 3.5 - 5.2 g/dL    Bilirubin Total 1.1 <=1.2 mg/dL    GFR Estimate >90 >60 mL/min/1.73m2   CBC with platelets and differential    Collection Time: 09/09/23  8:27 AM   Result Value Ref Range    WBC Count 5.2 4.0 - 11.0 10e3/uL    RBC Count 3.60 (L) 4.40 - 5.90 10e6/uL    Hemoglobin 11.9 (L) 13.3 - 17.7 g/dL    Hematocrit 33.4 (L) 40.0 - 53.0 %    MCV 93 78 - 100 fL    MCH 33.1 (H) 26.5 - 33.0 pg    MCHC 35.6 31.5 - 36.5 g/dL    RDW 11.6 10.0 - 15.0 %    Platelet Count 106 (L) 150 - 450 10e3/uL    % Neutrophils 78 %    % Lymphocytes 14 %    % Monocytes 7 %    % Eosinophils 1 %    % Basophils 0 %    % Immature Granulocytes 0 %    NRBCs per 100 WBC 0 <1  /100    Absolute Neutrophils 4.0 1.6 - 8.3 10e3/uL    Absolute Lymphocytes 0.8 0.8 - 5.3 10e3/uL    Absolute Monocytes 0.3 0.0 - 1.3 10e3/uL    Absolute Eosinophils 0.1 0.0 - 0.7 10e3/uL    Absolute Basophils 0.0 0.0 - 0.2 10e3/uL    Absolute Immature Granulocytes 0.0 <=0.4 10e3/uL    Absolute NRBCs 0.0 10e3/uL          Physical and Psychiatric Examination:     /75 (BP Location: Right arm)   Pulse 76   Temp 99.7  F (37.6  C) (Oral)   Resp 17   SpO2 100%   Weight is 0 lbs 0 oz  There is no height or weight on file to calculate BMI.    Physical Exam:  I have reviewed the physical exam as documented by by the medical team and agree with findings and assessment and have no additional findings to add at this time.         MSE:   Appearance: awake, alert  Attitude:  cooperative  Eye Contact:  good  Mood:  anxious and depressed  Affect:  mood congruent and slightly restricted  Speech:  clear, coherent  Psychomotor Behavior:  no evidence of tardive dyskinesia, dystonia, or tics and evidence of dystonia  Muscle strength and tone: WNL  Thought Process:  logical and linear  Associations:  no loose associations  Thought Content:  no evidence of suicidal ideation or homicidal ideation and no evidence of psychotic thought  Insight:  fair  Judgement:  fair  Oriented to:  time, person, and place  Attention Span and Concentration:  intact  Recent and Remote Memory:  intact           DSM-5 Diagnosis:   Depression, unspecified  Anxiety, unspecified  Alcohol use disorder, severe  Opioid use disorder, severe          Assessment:   Patient found resting in bed and was agreeable to psychiatric assessment.  Patient appears depressed and anxious with minimal brightening throughout our interaction.  Patient reports motivation to return to [facility] treatment in order to continue sobriety.  Patient states that he has been experiencing symptoms of depression including worthlessness, hopelessness, helplessness, anhedonia,  "insomnia for approximately 5 years.  Patient denies current suicidal ideation however admits to thoughts of \"thinking he would be better off dead\".  Patient also reports significant social anxiety for approximately 5 years as well.  Patient describes insomnia as racing thoughts when attempting to lay down to sleep.  Patient requesting referrals for outpatient medication management as well as individual psychotherapy related to grief upon completion of chemical dependency treatment.  Patient denies current suicidal/homicidal ideation/intent/plan.  Patient denies history of anthony and/or psychotic symptoms.          Summary of Recommendations:   - Initiate Lexapro 10 mg daily for depression and anxiety.    - Initiate gabapentin 300 mg nightly for insomnia.    - Discussion of MAT for OUD: Methadone vs Buprenorphine. Currently on opiate for pain control. Would need to be initiated following discontinuation of opiates/when displaying adequate signs of withdrawal to avoid precipitated withdrawal.     - Recommend returning to [facility] chemical dependency treatment upon medical clearance.    - Recommend referral for outpatient medication management.    - Recommend referral for outpatient therapy (grief, CBT).     - Recommend addiction consultation for use of MAT for opioid use disorder.    - We will follow-up patient tomorrow.    9/10/23:     - Initiate Seroquel 12.5-25 mg q HS for insomnia.   - Referral for education to be provided re: methadone vs. Buprenorphine MAT for OUD.   - Referral for education to be provided re: campral vs. Naltrexone MAT for AUD.   - Recommend psychiatric medication management and psychotherapy following CD treatment.   -Continue Lexapro 10 mg with upward titration for symptom control.   -Denies SI/HI/psychosis/cravings for substances at time of assessment.   -Motivated to return to treatment at [facility].     CARLTON Cantu CNP on 9/10/2023 at 1:28 PM      \"This dictation was " "performed with voice recognition software and may contain errors,  omissions and inadvertent word substitution.\"           "

## 2023-09-09 NOTE — PROGRESS NOTES
[facility]    Medicine Progress Note - Hospitalist Service, GOLD TEAM 19    Date of Admission:  9/8/2023    Assessment & Plan     Danny Rico is a 27 year old male with past medical history of alcohol use disorder and dependence who presented to the ED for evaluation of abdominal pain and vomiting.     #Acute alcoholic pancreatitis, uncomplicated:  Vital stable.  Patient overall doing better.  -Pain control  -IV fluid  -Advance diet as tolerated  -Alcohol abstinence    Acute alcoholic gastritis.  Possible Amrita-Larsen tear due to retching  Nausea, vomiting    Doing better  -Switch IV Protonix to p.o. twice daily  -Antiemetics as needed  -Alcohol abstinence    #Alcohol use disorder  #Alcohol withdrawal  #Elevated LFTs  -Continue CIWA with Valium, gabapentin, clonidine  -Multivitamin, thiamine, folic acid daily  -Alcohol abstinence  -Psychiatry, CD consultation  Follow-up LFTs: Improving.-    #Thrombocytopenia: Monitor.  Like alcohol use related.    #Electrolyte abnormalities: Hypokalemia: Replace per protocol.       Diet: Advance Diet as Tolerated: Full Liquid Diet    DVT Prophylaxis: Low Risk/Ambulatory with no VTE prophylaxis indicated  Lozano Catheter: Not present  Lines: None     Cardiac Monitoring: None  Code Status: Full Code      Clinically Significant Risk Factors Present on Admission        # Hypokalemia: Lowest K = 2.8 mmol/L in last 2 days, will replace as needed    # Hypercalcemia: Highest Ca = 10.3 mg/dL in last 2 days, will monitor as appropriate  # Hypomagnesemia: Lowest Mg = 1.4 mg/dL in last 2 days, will replace as needed  # Anion Gap Metabolic Acidosis: Highest Anion Gap = 22 mmol/L in last 2 days, will monitor and treat as appropriate    # Thrombocytopenia: Lowest platelets = 106 in last 2 days, will monitor for bleeding                   Disposition Plan     Expected Discharge Date: 09/10/2023                  Nahum Koch MD  Hospitalist  Service, GOLD TEAM 19  [facility]  Securely message with [employer] (more info)  Text page via [facility] Paging/Directory   See signed in provider for up to date coverage information  ______________________________________________________________________    Interval History   Overall doing better.  Nausea vomiting improving.  Pain abdomen improving.  1 loose stool this morning.  No obvious active bleeding.  No fever.  No chest pain or shortness of breath.  Alcohol withdrawal-CIWA 6-8.    Physical Exam   Vital Signs: Temp: 99.7  F (37.6  C) Temp src: Oral BP: 123/75 Pulse: 76   Resp: 17 SpO2: 100 % O2 Device: None (Room air)    Weight: 0 lbs 0 oz    General Appearance: Awake, interactive, NAD  HEENT: AT/NC, Anicteric, Moist MM  Respiratory: Normal work of breathing. RA  Cardiovascular: S1 S2 Regular.  GI/Abd: Soft.  Mild tenderness epigastrium. ND. No g/r.   Extremities: Distally wwp. No pedal edema.  Neuro: AO x 4, Grossly non focal.   Skin: No acute rash on exposed areas.   Psychiatry: Stable mood.     Medical Decision Making       40 MINUTES SPENT BY ME on the date of service doing chart review, history, exam, documentation & further activities per the note.      Data     I have personally reviewed the following data over the past 24 hrs:    5.2  \   11.9 (L)   / 106 (L)     136 97 (L) 6.0 /  91   2.8 (L) 26 0.68 \     ALT: 99 (H) AST: 325 (H) AP: 88 TBILI: 1.1   ALB: 4.0 TOT PROTEIN: 6.9 LIPASE: N/A       Imaging results reviewed over the past 24 hrs:   No results found for this or any previous visit (from the past 24 hour(s)).  Recent Labs   Lab 09/09/23  0827 09/08/23  1350   WBC 5.2 5.0   HGB 11.9* 13.3   MCV 93 91   * 120*    130*   POTASSIUM 2.8* 2.8*   CHLORIDE 97* 82*   CO2 26 26   BUN 6.0 7.6   CR 0.68 0.60*   ANIONGAP 13 22*   MIGUEL 9.7 10.3*   GLC 91 98   ALBUMIN 4.0 5.0   PROTTOTAL 6.9 8.7*   BILITOTAL 1.1 1.8*   ALKPHOS 88 116   ALT 99* 141*   *  555*   LIPASE  --  925*

## 2023-09-09 NOTE — UTILIZATION REVIEW
"  [facility] Utilization Review  Admission Status; Secondary Review Determination     Admission Date: 9/8/2023  1:25 PM      Under the authority of the Utilization Management Committee, the utilization review process indicated a secondary review on the above patient.  The review outcome is based on review of the medical records, discussions with staff, and applying clinical experience noted on the date of the review.        (X) Observation Status Appropriate - This patient does not meet hospital inpatient criteria and is placed in observation status. If this patient's primary payer is Medicare and was admitted as an inpatient, Condition Code 44 should be used and patient status changed to \"observation\".   () Observation Status concurrent Review           RATIONALE FOR DETERMINATION   26 yo M with h/o alcohol induced pancreatitis, alcohol dependence, admitted with abdominal pain and vomiting. Patient found to have acute alcoholic pancreatitis, started on iv fluid hydration, pain control and advancing diet as tolerated. Switch iv to oral protonix, anti emetic protocol. Started on CIWA protocol with valium, gabapentin, clonidine. LFTs eleveated, hypokalemia, hypomagnesemia. MSSA score 19, 4, patient also seen by psychiatry team for CD.and started on lexapro and gabapentin. Complex patient who needs close monitoring in the hospital with ongoing interventions, with anticipated length of stay more than 2 midnights, recommned continue inpatient status.      The severity of illness, intensity of service provided, expected LOS make the care appropriate for observation status at this time.        The information on this document is developed by the utilization review team in order for the business office to ensure compliance.  This only denotes the appropriateness of proper admission status and does not reflect the quality of care rendered.         The definitions of Inpatient Status and Observation Status used in " making the determination above are those provided in the CMS Coverage Manual, Chapter 1 and Chapter 6, section 70.4.      Sincerely,       Lynsey Davis MD  Physician Advisor  Utilization Review-[place]    Phone: [phone]

## 2023-09-10 VITALS
HEIGHT: 67 IN | RESPIRATION RATE: 20 BRPM | TEMPERATURE: 99.4 F | WEIGHT: 137.79 LBS | HEART RATE: 85 BPM | DIASTOLIC BLOOD PRESSURE: 92 MMHG | SYSTOLIC BLOOD PRESSURE: 131 MMHG | BODY MASS INDEX: 21.63 KG/M2 | OXYGEN SATURATION: 97 %

## 2023-09-10 LAB
ALBUMIN SERPL BCG-MCNC: 4 G/DL (ref 3.5–5.2)
ALP SERPL-CCNC: 84 U/L (ref 40–129)
ALT SERPL W P-5'-P-CCNC: 79 U/L (ref 0–70)
ANION GAP SERPL CALCULATED.3IONS-SCNC: 9 MMOL/L (ref 7–15)
AST SERPL W P-5'-P-CCNC: 140 U/L (ref 0–45)
BILIRUB SERPL-MCNC: 0.5 MG/DL
BUN SERPL-MCNC: 5 MG/DL (ref 6–20)
CALCIUM SERPL-MCNC: 9.7 MG/DL (ref 8.6–10)
CHLORIDE SERPL-SCNC: 103 MMOL/L (ref 98–107)
CREAT SERPL-MCNC: 0.69 MG/DL (ref 0.67–1.17)
DEPRECATED HCO3 PLAS-SCNC: 23 MMOL/L (ref 22–29)
EGFRCR SERPLBLD CKD-EPI 2021: >90 ML/MIN/1.73M2
ERYTHROCYTE [DISTWIDTH] IN BLOOD BY AUTOMATED COUNT: 11.9 % (ref 10–15)
GLUCOSE SERPL-MCNC: 96 MG/DL (ref 70–99)
HCT VFR BLD AUTO: 35.2 % (ref 40–53)
HGB BLD-MCNC: 12.2 G/DL (ref 13.3–17.7)
MAGNESIUM SERPL-MCNC: 3.5 MG/DL (ref 1.7–2.3)
MCH RBC QN AUTO: 33 PG (ref 26.5–33)
MCHC RBC AUTO-ENTMCNC: 34.7 G/DL (ref 31.5–36.5)
MCV RBC AUTO: 95 FL (ref 78–100)
PLATELET # BLD AUTO: 120 10E3/UL (ref 150–450)
POTASSIUM SERPL-SCNC: 3.5 MMOL/L (ref 3.4–5.3)
POTASSIUM SERPL-SCNC: 3.9 MMOL/L (ref 3.4–5.3)
PROT SERPL-MCNC: 7.1 G/DL (ref 6.4–8.3)
RBC # BLD AUTO: 3.7 10E6/UL (ref 4.4–5.9)
SODIUM SERPL-SCNC: 135 MMOL/L (ref 136–145)
WBC # BLD AUTO: 6.3 10E3/UL (ref 4–11)

## 2023-09-10 PROCEDURE — 250N000013 HC RX MED GY IP 250 OP 250 PS 637: Performed by: INTERNAL MEDICINE

## 2023-09-10 PROCEDURE — 258N000003 HC RX IP 258 OP 636: Performed by: INTERNAL MEDICINE

## 2023-09-10 PROCEDURE — 84132 ASSAY OF SERUM POTASSIUM: CPT | Performed by: INTERNAL MEDICINE

## 2023-09-10 PROCEDURE — 36415 COLL VENOUS BLD VENIPUNCTURE: CPT | Performed by: INTERNAL MEDICINE

## 2023-09-10 PROCEDURE — 250N000013 HC RX MED GY IP 250 OP 250 PS 637: Performed by: STUDENT IN AN ORGANIZED HEALTH CARE EDUCATION/TRAINING PROGRAM

## 2023-09-10 PROCEDURE — 250N000013 HC RX MED GY IP 250 OP 250 PS 637: Performed by: EMERGENCY MEDICINE

## 2023-09-10 PROCEDURE — G0378 HOSPITAL OBSERVATION PER HR: HCPCS

## 2023-09-10 PROCEDURE — 83735 ASSAY OF MAGNESIUM: CPT | Performed by: PHYSICIAN ASSISTANT

## 2023-09-10 PROCEDURE — 250N000013 HC RX MED GY IP 250 OP 250 PS 637: Performed by: NURSE PRACTITIONER

## 2023-09-10 PROCEDURE — 36415 COLL VENOUS BLD VENIPUNCTURE: CPT | Performed by: PHYSICIAN ASSISTANT

## 2023-09-10 PROCEDURE — 99239 HOSP IP/OBS DSCHRG MGMT >30: CPT | Performed by: INTERNAL MEDICINE

## 2023-09-10 PROCEDURE — 85027 COMPLETE CBC AUTOMATED: CPT | Performed by: INTERNAL MEDICINE

## 2023-09-10 PROCEDURE — 84132 ASSAY OF SERUM POTASSIUM: CPT | Performed by: PHYSICIAN ASSISTANT

## 2023-09-10 RX ORDER — QUETIAPINE FUMARATE 25 MG/1
12.5-25 TABLET, FILM COATED ORAL AT BEDTIME
Qty: 30 TABLET | Refills: 0 | Status: SHIPPED | OUTPATIENT
Start: 2023-09-10 | End: 2023-10-10

## 2023-09-10 RX ORDER — ESCITALOPRAM OXALATE 10 MG/1
10 TABLET ORAL DAILY
Qty: 30 TABLET | Refills: 0 | Status: SHIPPED | OUTPATIENT
Start: 2023-09-11

## 2023-09-10 RX ORDER — PANTOPRAZOLE SODIUM 40 MG/1
40 TABLET, DELAYED RELEASE ORAL 2 TIMES DAILY
Qty: 60 TABLET | Refills: 0 | Status: SHIPPED | OUTPATIENT
Start: 2023-09-10

## 2023-09-10 RX ORDER — FOLIC ACID 1 MG/1
1 TABLET ORAL DAILY
Qty: 30 TABLET | Refills: 0 | Status: SHIPPED | OUTPATIENT
Start: 2023-09-11

## 2023-09-10 RX ORDER — IBUPROFEN 200 MG
200 TABLET ORAL EVERY 6 HOURS PRN
Status: DISCONTINUED | OUTPATIENT
Start: 2023-09-10 | End: 2023-09-10

## 2023-09-10 RX ORDER — CLONIDINE HYDROCHLORIDE 0.1 MG/1
0.1 TABLET ORAL DAILY
Status: DISCONTINUED | OUTPATIENT
Start: 2023-09-11 | End: 2023-09-10 | Stop reason: HOSPADM

## 2023-09-10 RX ORDER — MULTIPLE VITAMINS W/ MINERALS TAB 9MG-400MCG
1 TAB ORAL DAILY
Qty: 30 TABLET | Refills: 0 | Status: SHIPPED | OUTPATIENT
Start: 2023-09-11

## 2023-09-10 RX ADMIN — SODIUM CHLORIDE: 9 INJECTION, SOLUTION INTRAVENOUS at 06:38

## 2023-09-10 RX ADMIN — MULTIPLE VITAMINS W/ MINERALS TAB 1 TABLET: TAB at 08:49

## 2023-09-10 RX ADMIN — LORAZEPAM 1 MG: 1 TABLET ORAL at 08:49

## 2023-09-10 RX ADMIN — GABAPENTIN 900 MG: 300 CAPSULE ORAL at 06:36

## 2023-09-10 RX ADMIN — GABAPENTIN 900 MG: 300 CAPSULE ORAL at 14:53

## 2023-09-10 RX ADMIN — THIAMINE HCL TAB 100 MG 100 MG: 100 TAB at 08:49

## 2023-09-10 RX ADMIN — IBUPROFEN 200 MG: 200 TABLET, FILM COATED ORAL at 08:49

## 2023-09-10 RX ADMIN — PANTOPRAZOLE SODIUM 40 MG: 40 TABLET, DELAYED RELEASE ORAL at 06:36

## 2023-09-10 RX ADMIN — ESCITALOPRAM OXALATE 10 MG: 10 TABLET ORAL at 09:54

## 2023-09-10 RX ADMIN — CLONIDINE HYDROCHLORIDE 0.1 MG: 0.1 TABLET ORAL at 08:49

## 2023-09-10 RX ADMIN — TRAZODONE HYDROCHLORIDE 25 MG: 50 TABLET ORAL at 02:56

## 2023-09-10 RX ADMIN — FOLIC ACID 1 MG: 1 TABLET ORAL at 08:49

## 2023-09-10 ASSESSMENT — ACTIVITIES OF DAILY LIVING (ADL)
ADLS_ACUITY_SCORE: 18

## 2023-09-10 NOTE — PROGRESS NOTES
"VS: BP (!) 131/92 (BP Location: Right arm, Patient Position: Semi-Disla's, Cuff Size: Adult Regular)   Pulse 85   Temp 99.4  F (37.4  C) (Oral)   Resp 20   Ht 1.702 m (5' 7\")   Wt 62.5 kg (137 lb 12.6 oz)   SpO2 97%   BMI 21.58 kg/m     O2: Stable on RA. LS clear and equal bilaterally. Denies chest pain and SOB.    Output: Voids spontaneously without difficulty to bathroom.    Last BM: 09/10. denies abdominal discomfort. BS active.     Activity: Independent.               Skin:  All visible skin intact.    Pain: Pain managed with Ibuprofen.    CMS: Intact, AOx4. Denies numbness and tingling.   Dressing: No dressing.    Diet: Regular diet. Reports mild nausea.     LDA: PIV taken out during shift.    Equipment: IV pole, personal belongings, and call light within reach.    Plan: Continue with plan of care. Call light within reach, pt able to make needs known.    Additional Info: Pt discharging today.       "

## 2023-09-10 NOTE — PROGRESS NOTES
[facility]    Medicine Progress Note - Hospitalist Service, GOLD TEAM 19    Date of Admission:  9/8/2023    Assessment & Plan     Danny Rico is a 27 year old male with past medical history of alcohol use disorder and dependence who presented to the ED for evaluation of abdominal pain and vomiting.     #Acute alcoholic pancreatitis, uncomplicated:  Vital stable.  Patient overall doing better.  -Pain control  -IV fluid-discontinue  -Advance diet as tolerated, to regular.  -Alcohol abstinence    Acute alcoholic gastritis.  Possible Amrita-Larsen tear due to retching  Nausea, vomiting    Doing better.  Hemoglobin 12.2.  Stable.  -Continue Protonix 40 mg twice daily  -Antiemetics as needed  -Alcohol abstinence    #Alcohol use disorder  #Alcohol withdrawal  #Elevated LFTs  -Continue CIWA with Valium, gabapentin, clonidine  -Multivitamin, thiamine, folic acid daily  -Alcohol abstinence  -Psychiatry, CD consultation  Follow-up LFTs: Improving.-    9/10: Doing better. detoxed.  Can discontinue CIWA, lorazepam.    #Thrombocytopenia: Monitor.  Like alcohol use related.    #Electrolyte abnormalities: Hypokalemia: Replaced.       Diet: Advance Diet as Tolerated: Regular Diet Adult    DVT Prophylaxis: Low Risk/Ambulatory with no VTE prophylaxis indicated  Lozano Catheter: Not present  Lines: None     Cardiac Monitoring: None  Code Status: Full Code      Clinically Significant Risk Factors        # Hypokalemia: Lowest K = 2.8 mmol/L in last 2 days, will replace as needed    # Hypercalcemia: Highest Ca = 10.3 mg/dL in last 2 days, will monitor as appropriate  # Hypomagnesemia: Lowest Mg = 1.4 mg/dL in last 2 days, will replace as needed  # Anion Gap Metabolic Acidosis: Highest Anion Gap = 22 mmol/L in last 2 days, will monitor and treat as appropriate      # Thrombocytopenia: Lowest platelets = 106 in last 2 days, will monitor for bleeding              # Support System: poor social  support noted in nursing assessment  # Housing Instability: noted in nursing assessment         Disposition Plan     Expected Discharge Date: 09/10/2023                  Nauhm Koch MD  Hospitalist Service, GOLD TEAM 19  [facility]  Securely message with [employer] (more info)  Text page via [employer] Paging/Directory   See signed in provider for up to date coverage information  ______________________________________________________________________    Interval History   Overall doing better.  Nausea, vomiting improved.  Pain abdomen improving.  Tolerating regular diet.  No fever.  No chest pain or shortness of breath.  No active alcohol withdrawal.  Denies using any illicit drug while in hospital.    Physical Exam   Vital Signs: Temp: 99.4  F (37.4  C) Temp src: Oral BP: (!) 131/92 Pulse: 85   Resp: 20 SpO2: 97 % O2 Device: None (Room air)    Weight: 137 lbs 12.6 oz      General Appearance: Awake, interactive, NAD  Respiratory: Normal work of breathing. RA  Cardiovascular: RRR  GI: Soft. NT. ND.  Extremities: Distally wwp. No pedal edema  Skin: No acute rash on exposed areas.   Neuro: Grossly non focal.   Others: Stable mood.      Medical Decision Making       40 MINUTES SPENT BY ME on the date of service doing chart review, history, exam, documentation & further activities per the note.      Data     I have personally reviewed the following data over the past 24 hrs:    6.3  \   12.2 (L)   / 120 (L)     135 (L) 103 5.0 (L) /  96   3.9 23 0.69 \     ALT: 79 (H) AST: 140 (H) AP: 84 TBILI: 0.5   ALB: 4.0 TOT PROTEIN: 7.1 LIPASE: N/A     Imaging results reviewed over the past 24 hrs:   No results found for this or any previous visit (from the past 24 hour(s)).  Recent Labs   Lab 09/10/23  0827 09/10/23  0101 09/09/23 0827 09/08/23  1350   WBC 6.3  --  5.2 5.0   HGB 12.2*  --  11.9* 13.3   MCV 95  --  93 91   *  --  106* 120*   *  --  136 130*   POTASSIUM 3.9 3.5  2.8* 2.8*   CHLORIDE 103  --  97* 82*   CO2 23  --  26 26   BUN 5.0*  --  6.0 7.6   CR 0.69  --  0.68 0.60*   ANIONGAP 9  --  13 22*   MIGUEL 9.7  --  9.7 10.3*   GLC 96  --  91 98   ALBUMIN 4.0  --  4.0 5.0   PROTTOTAL 7.1  --  6.9 8.7*   BILITOTAL 0.5  --  1.1 1.8*   ALKPHOS 84  --  88 116   ALT 79*  --  99* 141*   *  --  325* 555*   LIPASE  --   --   --  925*

## 2023-09-10 NOTE — PROGRESS NOTES
6MS DISCHARGE    D: Patient discharged to [facility] at 1545. Patient accompanied by self.    I: Discharge prescriptions given to patient. All discharge medications and instructions reviewed with patient. Patient instructed to seek care if experiencing worsening symptoms, such as increased nausea, hyperemesis, or changes in neuro status. Other phone numbers to call with questions or concerns after discharge reviewed. Education completed.    A: Patient verbalized understanding of discharge medications and instructions. Prescribed home medications given to patient.  Belongings returned to patient.    P: Patient to follow-up on Chemical Dependency treatment program with [facility] counselors.       Shayy Landa RN on 9/10/2023 at 3:33 PM

## 2023-09-10 NOTE — PLAN OF CARE
Please refer to flowsheet for full patient assessment and MAR for all medications administered during shift.        Pt A&Ox4, VSS, No complaint of pain. POC discussed, questions encouraged and answered. Plan for today TBD. No acute events during shift. Frequent round and checks done. POC continues.

## 2023-09-10 NOTE — DISCHARGE SUMMARY
[facility]  Hospitalist Discharge Summary      Date of Admission:  9/8/2023  Date of Discharge:  9/10/2023  Discharging Provider: Nahum Kcoh MD  Discharge Service: Hospitalist Service, GOLD TEAM 19    Discharge Diagnoses     Acute alcoholic pancreatitis  Acute alcoholic gastritis  Possible Amrita-Larsen tear  Nausea, vomiting  Alcohol use disorder  Alcohol withdrawal  Elevated LFTs: Related to alcohol use  Hypokalemia  Thrombocytopenia      Clinically Significant Risk Factors          Follow-ups Needed After Discharge   Follow-up Appointments     [facility] Follow-up and recommended labs and tests      Follow up with CD treatment program. Recommend alcohol and illicit drug   abstinence.     Follow up with GI team as needed for ?vomiting of blood. 3-4 weeks.     Appointments on [place] and/or [facility] (with [facility] or [facility]   provider or service). Call [phone] if you haven't heard regarding   these appointments within 7 days of discharge.                Discharge Disposition   Discharged to home  -- Patient claims he is going to inpatient treatment program.    Condition at discharge: Stable    Hospital Course   Danny Rico is a 27 year old male with past medical history of alcohol use disorder and dependence who presented to the ED for evaluation of abdominal pain and vomiting.     #Acute alcoholic pancreatitis, uncomplicated:  Improving.  Vital stable.  Patient overall doing better.  Pain controlled.  -IV fluid-discontinued  -Advance diet as tolerated, to regular.  Tolerating well.    -Alcohol abstinence    Acute alcoholic gastritis.  Possible Amrita-Larsen tear due to retching  Nausea, vomiting    Doing better.  Hemoglobin 12.2.  Stable.  -Continue Protonix 40 mg twice daily  -Antiemetics as needed  -Alcohol abstinence  Follow-up GI outpatient as needed.  Avoid NSAIDs    #Alcohol use disorder  #Alcohol withdrawal  #Elevated LFTs  -Treated with  CIWA with Valium, gabapentin, clonidine  -Multivitamin, thiamine, folic acid daily  -Alcohol abstinence  -Psychiatry consultation done.  -Recommended inpatient chemical dependency treatment.    9/10: Doing better. detoxed.  Discontinue CIWA, lorazepam.    #Thrombocytopenia: Monitor.  Like alcohol use related.  Improving.    #Electrolyte abnormalities: Hypokalemia: Replaced.    Consultations This Hospital Stay   CARE MANAGEMENT / SOCIAL WORK IP CONSULT  PSYCHIATRY IP CONSULT  CHEMICAL DEPENDENCY IP CONSULT  CARE MANAGEMENT / SOCIAL WORK IP CONSULT    Code Status   Full Code    Time Spent on this Encounter   I, Nahum Koch MD, personally saw the patient today and spent greater than 30 minutes discharging this patient.       Nahum Koch MD  [facility] MED SURG  [facility] [address]  Phone: [phone]  Fax: [phone]  ______________________________________________________________________    Physical Exam   Vital Signs: Temp: 99.4  F (37.4  C) Temp src: Oral BP: (!) 131/92 Pulse: 85   Resp: 20 SpO2: 97 % O2 Device: None (Room air)    Weight: 137 lbs 12.6 oz  General Appearance: Awake, interactive, NAD  HEENT: AT/NC, Anicteric, Moist MM  Neck: Supple.   Respiratory: Normal work of breathing. CTA BL.   Cardiovascular: S1 S2 Regular.  GI/Abd: Soft. NT. ND. No g/r. BS+  Extremities: Distally wwp. No pedal edema.  Neuro: AO x 4, Grossly non focal.   Skin: No acute rash on exposed areas.   Psychiatry: Stable mood.        Primary Care Physician   Physician No Ref-Primary    Discharge Orders      Reason for your hospital stay    Acute alcoholic pancreatitis, gastritis, ?keyon daniels tear, alcohol use disorder, withdrawal.  Doing well. Detoxed. Tolerating oral diet.   Recommend alcohol, illicit substance abstinence.  Follow-up with chemical dependency treatment programs.     Activity    Your activity upon discharge: activity as tolerated     Adult [facility] Follow-up and  recommended labs and tests    Follow up with CD treatment program. Recommend alcohol and illicit drug abstinence.     Follow up with GI team as needed for ?vomiting of blood. 3-4 weeks.     Appointments on [place] and/or [facility] (with [facility] or [facility] provider or service). Call [phone] if you haven't heard regarding these appointments within 7 days of discharge.     Diet    Follow this diet upon discharge: Orders Placed This Encounter      Advance Diet as Tolerated: Regular Diet Adult       Significant Results and Procedures   Most Recent 3 CBC's:  Recent Labs   Lab Test 09/10/23  0827 09/09/23  0827 09/08/23  1350   WBC 6.3 5.2 5.0   HGB 12.2* 11.9* 13.3   MCV 95 93 91   * 106* 120*     Most Recent 3 BMP's:  Recent Labs   Lab Test 09/10/23  0827 09/10/23  0101 09/09/23 0827 09/08/23  1350   *  --  136 130*   POTASSIUM 3.9 3.5 2.8* 2.8*   CHLORIDE 103  --  97* 82*   CO2 23  --  26 26   BUN 5.0*  --  6.0 7.6   CR 0.69  --  0.68 0.60*   ANIONGAP 9  --  13 22*   MIGUEL 9.7  --  9.7 10.3*   GLC 96  --  91 98     Most Recent 2 LFT's:  Recent Labs   Lab Test 09/10/23  0827 09/09/23  0827   * 325*   ALT 79* 99*   ALKPHOS 84 88   BILITOTAL 0.5 1.1     Most Recent 3 INR's:No lab results found.  Most Recent ESR & CRP:No lab results found., No results found for this or any previous visit.    Discharge Medications   Current Discharge Medication List        START taking these medications    Details   escitalopram (LEXAPRO) 10 MG tablet Take 1 tablet (10 mg) by mouth daily  Qty: 30 tablet, Refills: 0    Associated Diagnoses: Alcohol dependence with uncomplicated withdrawal (H)      folic acid (FOLVITE) 1 MG tablet Take 1 tablet (1 mg) by mouth daily  Qty: 30 tablet, Refills: 0    Associated Diagnoses: Alcohol dependence with uncomplicated withdrawal (H)      multivitamin w/minerals (THERA-VIT-M) tablet Take 1 tablet by mouth daily  Qty: 30 tablet, Refills: 0    Associated Diagnoses: Alcohol  dependence with uncomplicated withdrawal (H)      pantoprazole (PROTONIX) 40 MG EC tablet Take 1 tablet (40 mg) by mouth 2 times daily  Qty: 60 tablet, Refills: 0    Associated Diagnoses: Alcohol dependence with uncomplicated withdrawal (H)           Allergies   No Known Allergies

## 2023-09-11 LAB
ATRIAL RATE - MUSE: 68 BPM
DIASTOLIC BLOOD PRESSURE - MUSE: NORMAL MMHG
INTERPRETATION ECG - MUSE: NORMAL
P AXIS - MUSE: 54 DEGREES
PR INTERVAL - MUSE: 156 MS
QRS DURATION - MUSE: 116 MS
QT - MUSE: 512 MS
QTC - MUSE: 544 MS
R AXIS - MUSE: 69 DEGREES
SYSTOLIC BLOOD PRESSURE - MUSE: NORMAL MMHG
T AXIS - MUSE: 44 DEGREES
VENTRICULAR RATE- MUSE: 68 BPM